# Patient Record
Sex: MALE | Race: BLACK OR AFRICAN AMERICAN | Employment: UNEMPLOYED | URBAN - METROPOLITAN AREA
[De-identification: names, ages, dates, MRNs, and addresses within clinical notes are randomized per-mention and may not be internally consistent; named-entity substitution may affect disease eponyms.]

---

## 2017-04-01 ENCOUNTER — HOSPITAL ENCOUNTER (EMERGENCY)
Age: 27
Discharge: HOME | End: 2017-04-01
Attending: INTERNAL MEDICINE
Payer: MEDICAID

## 2017-04-01 VITALS
HEART RATE: 125 BPM | WEIGHT: 315 LBS | TEMPERATURE: 100.2 F | SYSTOLIC BLOOD PRESSURE: 138 MMHG | RESPIRATION RATE: 20 BRPM | HEIGHT: 72 IN | OXYGEN SATURATION: 100 % | DIASTOLIC BLOOD PRESSURE: 87 MMHG | BODY MASS INDEX: 42.66 KG/M2

## 2017-04-01 DIAGNOSIS — R68.89 FLU-LIKE SYMPTOMS: Primary | ICD-10-CM

## 2017-04-01 DIAGNOSIS — J02.9 PHARYNGITIS, UNSPECIFIED ETIOLOGY: ICD-10-CM

## 2017-04-01 LAB
FLUAV AG NPH QL IA: NEGATIVE
FLUBV AG NOSE QL IA: NEGATIVE

## 2017-04-01 PROCEDURE — 87804 INFLUENZA ASSAY W/OPTIC: CPT | Performed by: INTERNAL MEDICINE

## 2017-04-01 PROCEDURE — 87081 CULTURE SCREEN ONLY: CPT | Performed by: INTERNAL MEDICINE

## 2017-04-01 PROCEDURE — 99283 EMERGENCY DEPT VISIT LOW MDM: CPT

## 2017-04-01 RX ORDER — AZITHROMYCIN 250 MG/1
TABLET, FILM COATED ORAL
Qty: 6 TAB | Refills: 0 | Status: SHIPPED | OUTPATIENT
Start: 2017-04-01 | End: 2017-04-06

## 2017-04-01 RX ORDER — LIDOCAINE HYDROCHLORIDE 20 MG/ML
SOLUTION OROPHARYNGEAL
Qty: 200 ML | Refills: 0 | Status: SHIPPED | OUTPATIENT
Start: 2017-04-01 | End: 2017-07-03 | Stop reason: ALTCHOICE

## 2017-04-01 RX ORDER — IBUPROFEN 800 MG/1
800 TABLET ORAL
Qty: 20 TAB | Refills: 0 | Status: SHIPPED | OUTPATIENT
Start: 2017-04-01 | End: 2017-04-08

## 2017-04-01 RX ORDER — ACETAMINOPHEN AND CODEINE PHOSPHATE 300; 30 MG/1; MG/1
1 TABLET ORAL
Qty: 12 TAB | Refills: 0 | Status: SHIPPED | OUTPATIENT
Start: 2017-04-01 | End: 2017-07-03

## 2017-04-01 NOTE — LETTER
[facility] [place] 
THE [facility] EMERGENCY DEPT 
[address] [address] 
[phone] Work Note Date: 4/1/2017 To Whom It May concern: 
 
Tiffanie Ca was seen and treated today in the emergency room by the following provider(s): 
Attending Provider: Galileo Metcalf MD.   
 
Tiffanie Ca may return to work on 4/3/17. Sincerely, Galileo Metcalf MD

## 2017-04-01 NOTE — ED PROVIDER NOTES
[address]  EMERGENCY DEPARTMENT HISTORY AND PHYSICAL EXAM       Date: 4/1/2017   Patient Name: Angel Franklin   YOB: 1990  Medical Record Number: [id number]    History of Presenting Illness     Chief Complaint   Patient presents with    Sore Throat    Headache        History Provided By:  Patient     Additional History:   11:48 AM  Angel Franklin is a 27 y.o. male who presents to the emergency department C/O a non-radiating worsening HA (9.5/10) and sore throat onset yesterday. Associated sxs include productive cough w/ phlegm and nausea. Pt denies any neck pain, CP, vomiting, ear pain, rashes, and any other sxs or complaints at this time. Primary Care Provider: Jamialh Carroll MD   Specialist:    Past History     Past Medical History:   Past Medical History:   Diagnosis Date    Psychiatric disorder     anxiety    Restless leg syndrome         Past Surgical History:   History reviewed. No pertinent surgical history. Family History:   History reviewed. No pertinent family history. Social History:   Social History   Substance Use Topics    Smoking status: Never Smoker    Smokeless tobacco: None    Alcohol use No        Allergies: Allergies   Allergen Reactions    Penicillins Other (comments)     Since he was a kid        Review of Systems   Review of Systems   HENT: Positive for sore throat. Negative for ear pain. Cardiovascular: Negative for chest pain. Gastrointestinal: Positive for nausea. Negative for vomiting. Musculoskeletal: Negative for neck pain. Skin: Negative for rash. Neurological: Positive for headaches. All other systems reviewed and are negative. Physical Exam  Vitals:    04/01/17 1118   BP: 138/87   Pulse: (!) 125   Resp: 20   Temp: 100.2 °F (37.9 °C)   SpO2: 100%   Weight: 145.2 kg (320 lb)   Height: 6' (1.829 m)       Physical Exam   Constitutional: He is oriented to person, place, and time.  He appears well-developed and well-nourished. HENT:   Head: Normocephalic and atraumatic. Right Ear: External ear normal. Tympanic membrane is erythematous. Left Ear: External ear normal. Tympanic membrane is erythematous. Nose: Mucosal edema present. Mouth/Throat: Posterior oropharyngeal edema present. No ear effusions  Moderate bilateral turbinates w/ edema and erythema   Throat is mild redness, some postnasal drainage    Eyes: Conjunctivae and EOM are normal. Pupils are equal, round, and reactive to light. Neck: Normal range of motion. Neck supple. No JVD present. No tracheal deviation present. No thyromegaly present. Cardiovascular: Normal rate, regular rhythm, normal heart sounds and intact distal pulses. Pulmonary/Chest: Effort normal and breath sounds normal.   Abdominal: Soft. Bowel sounds are normal. He exhibits no distension and no mass. There is no tenderness. No HSM   Musculoskeletal: Normal range of motion. He exhibits no edema or tenderness. Lymphadenopathy:     He has no cervical adenopathy. Neurological: He is alert and oriented to person, place, and time. He has normal reflexes. No cranial nerve deficit. He exhibits normal muscle tone. Coordination normal.   No focal weakness   Skin: Skin is warm and dry. Psychiatric: He has a normal mood and affect. His behavior is normal. Thought content normal.   Nursing note and vitals reviewed. Diagnostic Study Results     Labs -      Recent Results (from the past 12 hour(s))   STREP THROAT SCREEN    Collection Time: 04/01/17 11:20 AM   Result Value Ref Range    Special Requests: NO SPECIAL REQUESTS      Strep Screen NEGATIVE       Strep Screen (NOTE)  RAPID TEST PERFORMED BY [id number].        Culture result: PENDING    INFLUENZA A & B AG (RAPID TEST)    Collection Time: 04/01/17 12:10 PM   Result Value Ref Range    Influenza A Antigen NEGATIVE  NEG      Influenza B Antigen NEGATIVE  NEG         Radiologic Studies -  No orders to display       Medical Decision Making   I am the first provider for this patient. I reviewed the vital signs, available nursing notes, past medical history, past surgical history, family history and social history. DDx: Flu, other viral pharyngitis  Doubt: PNA    Pt does not appear septic     Vital Signs-Reviewed the patient's vital signs. Patient Vitals for the past 12 hrs:   Temp Pulse Resp BP SpO2   04/01/17 1118 100.2 °F (37.9 °C) (!) 125 20 138/87 100 %       Pulse Oximetry Analysis - Normal 100% on Room Air      ED Course:  11:48 AM  Initial assessment performed. Medications Given in the ED:  Medications - No data to display    Discharge Note:  1:07 PM  Pt has been reexamined. Patient has no new complaints, changes, or physical findings. Care plan outlined and precautions discussed. Results were reviewed with the patient. All medications were reviewed with the patient; will d/c home with Motrin, Tylenol #3, Lidocaine, and Azithromycin. All of pt's questions and concerns were addressed. Patient was instructed and agrees to follow up with [facility], as well as to return to the ED upon further deterioration. Patient is ready to go home. Diagnosis   Clinical Impression:   1. Flu-like symptoms    2. Pharyngitis, unspecified etiology         Follow-up Information     Follow up With Details Comments Contact Info    [facility] Schedule an appointment as soon as possible for a visit in 2 days Follow up with your primary care physician or at the [facility] clinic  [id number] [place] [address], [address] [place] [address] Road [phone]    THE [facility] EMERGENCY DEPT  As needed, If symptoms worsen [address]  [facility] [address] [phone]          Current Discharge Medication List      START taking these medications    Details   ibuprofen (MOTRIN) 800 mg tablet Take 1 Tab by mouth every eight (8) hours as needed for Pain for up to 7 days.   Qty: 20 Tab, Refills: 0      azithromycin (ZITHROMAX) 250 mg tablet Take 2 tablets today then 1 tablet daily  x4 days  Qty: 6 Tab, Refills: 0      acetaminophen-codeine (TYLENOL-CODEINE #3) 300-30 mg per tablet Take 1 Tab by mouth every four (4) hours as needed for Pain. Max Daily Amount: 6 Tabs. Qty: 12 Tab, Refills: 0      lidocaine (LIDOCAINE VISCOUS) 2 % solution Put 10 mL in mouth and swish and spit out QAC and at bedtime  Qty: 200 mL, Refills: 0             _______________________________   Attestations: This note is prepared by Geraldine Wilkinson, acting as a Scribe for Vivienne Grimes MD on 11:45 AM on 4/1/2017 . Vivienne Grimes MD: The scribe's documentation has been prepared under my direction and personally reviewed by me in its entirety.   _______________________________

## 2017-04-01 NOTE — DISCHARGE INSTRUCTIONS
Sore Throat: Care Instructions  Your Care Instructions    Infection by bacteria or a virus causes most sore throats. Cigarette smoke, dry air, air pollution, allergies, and yelling can also cause a sore throat. Sore throats can be painful and annoying. Fortunately, most sore throats go away on their own. If you have a bacterial infection, your doctor may prescribe antibiotics. Follow-up care is a key part of your treatment and safety. Be sure to make and go to all appointments, and call your doctor if you are having problems. It's also a good idea to know your test results and keep a list of the medicines you take. How can you care for yourself at home? · If your doctor prescribed antibiotics, take them as directed. Do not stop taking them just because you feel better. You need to take the full course of antibiotics. · Gargle with warm salt water once an hour to help reduce swelling and relieve discomfort. Use 1 teaspoon of salt mixed in 1 cup of warm water. · Take an over-the-counter pain medicine, such as acetaminophen (Tylenol), ibuprofen (Advil, Motrin), or naproxen (Aleve). Read and follow all instructions on the label. · Be careful when taking over-the-counter cold or flu medicines and Tylenol at the same time. Many of these medicines have acetaminophen, which is Tylenol. Read the labels to make sure that you are not taking more than the recommended dose. Too much acetaminophen (Tylenol) can be harmful. · Drink plenty of fluids. Fluids may help soothe an irritated throat. Hot fluids, such as tea or soup, may help decrease throat pain. · Use over-the-counter throat lozenges to soothe pain. Regular cough drops or hard candy may also help. These should not be given to young children because of the risk of choking. · Do not smoke or allow others to smoke around you. If you need help quitting, talk to your doctor about stop-smoking programs and medicines.  These can increase your chances of quitting for good. · Use a vaporizer or humidifier to add moisture to your bedroom. Follow the directions for cleaning the machine. When should you call for help? Call your doctor now or seek immediate medical care if:  · You have new or worse trouble swallowing. · Your sore throat gets much worse on one side. Watch closely for changes in your health, and be sure to contact your doctor if you do not get better as expected. Where can you learn more? Go to http://lesly-ad.info/. Enter [phone] 80 [phone] in the search box to learn more about \"Sore Throat: Care Instructions. \"  Current as of: July 29, 2016  Content Version: 11.2  © 1129-1131 [employer]. Care instructions adapted under license by [employer] (which disclaims liability or warranty for this information). If you have questions about a medical condition or this instruction, always ask your healthcare professional. [address] any warranty or liability for your use of this information.

## 2017-04-03 LAB
B-HEM STREP THROAT QL CULT: NEGATIVE
B-HEM STREP THROAT QL CULT: NORMAL
BACTERIA SPEC CULT: NORMAL
SERVICE CMNT-IMP: NORMAL

## 2017-07-03 ENCOUNTER — HOSPITAL ENCOUNTER (EMERGENCY)
Age: 27
Discharge: HOME | End: 2017-07-03
Attending: EMERGENCY MEDICINE
Payer: MEDICAID

## 2017-07-03 VITALS
HEIGHT: 72 IN | RESPIRATION RATE: 18 BRPM | SYSTOLIC BLOOD PRESSURE: 150 MMHG | HEART RATE: 96 BPM | BODY MASS INDEX: 42.66 KG/M2 | DIASTOLIC BLOOD PRESSURE: 100 MMHG | OXYGEN SATURATION: 95 % | TEMPERATURE: 98.3 F | WEIGHT: 315 LBS

## 2017-07-03 DIAGNOSIS — T23.231A SECOND DEGREE BURN OF HAND AND FINGERS, RIGHT, INITIAL ENCOUNTER: Primary | ICD-10-CM

## 2017-07-03 DIAGNOSIS — T23.201A SECOND DEGREE BURN OF HAND AND FINGERS, RIGHT, INITIAL ENCOUNTER: Primary | ICD-10-CM

## 2017-07-03 PROCEDURE — 99283 EMERGENCY DEPT VISIT LOW MDM: CPT

## 2017-07-03 RX ORDER — ALPRAZOLAM 2 MG/1
2 TABLET ORAL
COMMUNITY
End: 2017-10-19

## 2017-07-03 NOTE — DISCHARGE INSTRUCTIONS
Major Brower: Care Instructions  Your Care Instructions    Burns injure the skin and can also injure other parts of your body, such as your muscles, nerves, lungs, and eyes. Burns may also become infected easily. Pain from a burn may get worse in the first few weeks as the burn heals. The color, texture, and feel of your skin will change as new skin and scar tissue form. You may notice that the burned area feels tight and hard while it is healing. It is important to continue to move the area as the burn heals to prevent loss of motion or loss of function in the area. Complete healing of a burn may take from a few months to up to a year. Recovering from a burn can be a painful and trying process, but there are steps you can take to make sure that the burn heals as well as possible. Follow-up care is a key part of your treatment and safety. Be sure to make and go to all appointments, and call your doctor if you are having problems. Its also a good idea to know your test results and keep a list of the medicines you take. How can you care for yourself at home? · Follow your doctor's instructions for caring for your burn. You may need special bandages or a compression garment if you have a very deep burn. · Keep your burn clean and dry. ¨ Wash the burn every day with a mild soap and water. ¨ Gently pat the burn dry after you wash and rinse it. · Protect your burn while it is healing. Cover your burn if you are going out in the cold or the sun. ¨ If the burn is on your hands or arms, wear long sleeves. ¨ Wear a hat if the burn is on your face. ¨ Wear shoes and socks if the burn is on your feet. · If your doctor prescribed antibiotics, take them as directed. Do not stop taking them just because you feel better. You need to take the full course of antibiotics. · Take an over-the-counter pain medicine, such as acetaminophen (Tylenol), ibuprofen (Advil, Motrin), or naproxen (Aleve), as needed.  Read and follow all instructions on the label. Do not use aspirin, because it can make bleeding in the burned area worse. · Do not take two or more pain medicines at the same time unless the doctor told you to. Many pain medicines have acetaminophen, which is Tylenol. Too much acetaminophen (Tylenol) can be harmful. · Do not scratch your burn. Talk to your doctor about what to use on your burn for itching. · Drink plenty of fluids. If you have kidney, heart, or liver disease and have to limit fluids, talk with your doctor before you increase the amount of fluids you drink. · Eat a healthy diet. Make sure that you are eating foods that have enough calories and protein to promote healing. Ask your doctor if you should take any extra vitamins or other supplements. · Do not smoke. Smoking slows healing and delays tissue repair. If you need help quitting, talk to your doctor about stop-smoking programs and medicines. These can increase your chances of quitting for good. When should you call for help? Call your doctor now or seek immediate medical care if:  · You have signs of infection, such as:  ¨ Increased pain, swelling, warmth, or redness. ¨ Red streaks leading from the burn. ¨ Pus draining from the burn. ¨ A fever. · You cannot move the burned area, or the area feels numb. Watch closely for changes in your health, and be sure to contact your doctor if:  · You do not get better as expected. Where can you learn more? Go to http://lesly-ad.info/. Enter P498 in the search box to learn more about \"Major Brower: Care Instructions. \"  Current as of: March 20, 2017  Content Version: 11.3  © 4538-3484 [employer]. Care instructions adapted under license by [employer] (which disclaims liability or warranty for this information).  If you have questions about a medical condition or this instruction, always ask your healthcare professional. [address] any warranty or liability for your use of this information.

## 2017-07-03 NOTE — LETTER
[facility] [place] 
THE [facility] EMERGENCY DEPT 
[address] [id number] [phone] Work/School Note Date: 7/3/2017 To Whom It May concern: 
 
Hardik Peng was seen and treated today in the emergency room by the following provider(s): 
Attending Provider: Armond Martino MD 
Physician Assistant: ZA Kaminski. Hardik Peng may return to work with limited duty. Limited use of right hand, no lifting/pushing/pulling of over 20 lbs for at least today and tomorrow or until cleared by PCP. Sincerely, Ravinder Jennings PA-C

## 2017-07-03 NOTE — ED NOTES
Discharge paperwork given to patient. Patient verbalizes understanding. Armband removed and shredded. Patient educated regarding pain medication, dosage, administration and side effects. Patient educated non-pharmacological pain intervention therapies.
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
## 2017-08-10 ENCOUNTER — OFFICE VISIT (OUTPATIENT)
Dept: SURGERY | Age: 27
End: 2017-08-10

## 2017-08-10 VITALS
HEART RATE: 92 BPM | DIASTOLIC BLOOD PRESSURE: 90 MMHG | RESPIRATION RATE: 16 BRPM | WEIGHT: 315 LBS | BODY MASS INDEX: 42.66 KG/M2 | SYSTOLIC BLOOD PRESSURE: 139 MMHG | OXYGEN SATURATION: 97 % | HEIGHT: 72 IN

## 2017-08-10 DIAGNOSIS — E66.01 MORBID OBESITY DUE TO EXCESS CALORIES (HCC): ICD-10-CM

## 2017-08-10 DIAGNOSIS — R06.83 SNORES: ICD-10-CM

## 2017-08-10 DIAGNOSIS — E66.01 MORBID OBESITY DUE TO EXCESS CALORIES (HCC): Primary | ICD-10-CM

## 2017-08-10 DIAGNOSIS — G89.29 CHRONIC LOW BACK PAIN, UNSPECIFIED BACK PAIN LATERALITY, WITH SCIATICA PRESENCE UNSPECIFIED: ICD-10-CM

## 2017-08-10 DIAGNOSIS — E66.01 MORBID OBESITY WITH BODY MASS INDEX (BMI) OF 50.0 TO 59.9 IN ADULT (HCC): ICD-10-CM

## 2017-08-10 DIAGNOSIS — K21.9 GASTROESOPHAGEAL REFLUX DISEASE WITHOUT ESOPHAGITIS: ICD-10-CM

## 2017-08-10 DIAGNOSIS — F41.9 ANXIETY: ICD-10-CM

## 2017-08-10 DIAGNOSIS — I10 ESSENTIAL HYPERTENSION: ICD-10-CM

## 2017-08-10 DIAGNOSIS — M54.5 CHRONIC LOW BACK PAIN, UNSPECIFIED BACK PAIN LATERALITY, WITH SCIATICA PRESENCE UNSPECIFIED: ICD-10-CM

## 2017-08-10 RX ORDER — TRAMADOL HYDROCHLORIDE 50 MG/1
50 TABLET ORAL
COMMUNITY
End: 2017-12-14

## 2017-08-10 RX ORDER — HYDROCHLOROTHIAZIDE 12.5 MG/1
12.5 TABLET ORAL DAILY
COMMUNITY
End: 2017-09-26

## 2017-08-10 NOTE — PATIENT INSTRUCTIONS
Body Mass Index: Care Instructions  Your Care Instructions    Body mass index (BMI) can help you see if your weight is raising your risk for health problems. It uses a formula to compare how much you weigh with how tall you are. · A BMI lower than 18.5 is considered underweight. · A BMI between 18.5 and 24.9 is considered healthy. · A BMI between 25 and 29.9 is considered overweight. A BMI of 30 or higher is considered obese. If your BMI is in the normal range, it means that you have a lower risk for weight-related health problems. If your BMI is in the overweight or obese range, you may be at increased risk for weight-related health problems, such as high blood pressure, heart disease, stroke, arthritis or joint pain, and diabetes. If your BMI is in the underweight range, you may be at increased risk for health problems such as fatigue, lower protection (immunity) against illness, muscle loss, bone loss, hair loss, and hormone problems. BMI is just one measure of your risk for weight-related health problems. You may be at higher risk for health problems if you are not active, you eat an unhealthy diet, or you drink too much alcohol or use tobacco products. Follow-up care is a key part of your treatment and safety. Be sure to make and go to all appointments, and call your doctor if you are having problems. It's also a good idea to know your test results and keep a list of the medicines you take. How can you care for yourself at home? · Practice healthy eating habits. This includes eating plenty of fruits, vegetables, whole grains, lean protein, and low-fat dairy. · If your doctor recommends it, get more exercise. Walking is a good choice. Bit by bit, increase the amount you walk every day. Try for at least 30 minutes on most days of the week. · Do not smoke. Smoking can increase your risk for health problems. If you need help quitting, talk to your doctor about stop-smoking programs and medicines. These can increase your chances of quitting for good. · Limit alcohol to 2 drinks a day for men and 1 drink a day for women. Too much alcohol can cause health problems. If you have a BMI higher than 25  · Your doctor may do other tests to check your risk for weight-related health problems. This may include measuring the distance around your waist. A waist measurement of more than 40 inches in men or 35 inches in women can increase the risk of weight-related health problems. · Talk with your doctor about steps you can take to stay healthy or improve your health. You may need to make lifestyle changes to lose weight and stay healthy, such as changing your diet and getting regular exercise. If you have a BMI lower than 18.5  · Your doctor may do other tests to check your risk for health problems. · Talk with your doctor about steps you can take to stay healthy or improve your health. You may need to make lifestyle changes to gain or maintain weight and stay healthy, such as getting more healthy foods in your diet and doing exercises to build muscle. Where can you learn more? Go to http://lesly-ad.info/. Enter S176 in the search box to learn more about \"Body Mass Index: Care Instructions. \"  Current as of: January 23, 2017  Content Version: 11.3  © 4896-1266 [employer]. Care instructions adapted under license by [employer] (which disclaims liability or warranty for this information). If you have questions about a medical condition or this instruction, always ask your healthcare professional. [place] [address] any warranty or liability for your use of this information. New patient Instructions      1. Ensure all pre-operative insurances requirements are complete (ie; dietary visits, psychology consults, primary care documentation, etc)    2. Adhere to pre-operative weight loss / weight maintenance plan discussed in the office today.     3. Contact the office with any questions on pre-operative clearance issues (ie; cardiology work-up, pulmonary work-up, upper GI study, etc). 4. If a barium upper GI study has been ordered for your evaluation, make sure you are on liquids only the morning of the procedure.

## 2017-08-10 NOTE — PROGRESS NOTES
Bariatric Surgery Consultation    Subjective: The patient is a 32 y.o. obese male with a Body mass index is 54.11 kg/(m^2). Kylee Montague The patient is currently his heaviest weight for the past several years. he has been overweight since his teen years. he has been considering surgery since last year. he desires surgery at this time because of multiple health concerns and their lifestyle issues which are hindered by their weight. he has been referred by his family physician Dr Sascha Yost for evaluation and treatment of their obesity via surgical intervention. John Ba has tried multiple diets in his lifetime most recently tried behavior modification and unsupervised diets    Bariatric comorbidities present are   Patient Active Problem List   Diagnosis Code    Morbid obesity ([facility] [address] E66.01    Morbid obesity with body mass index (BMI) of 50.0 to 59.9 in adult ([facility] [address] E66.01, Z68.43    Anxiety F41.9    Chronic back pain M54.9, G89.29    Hypertension I10    Snores R06.83    GERD (gastroesophageal reflux disease) K21.9    Sleep disorder breathing G47.30       The patient is considering laparoscopic sleeve gastrectomy for surgical weight loss due to their ineffective progress with medical forms of weight loss and the urging of their physician who cares for their primary medical issues. The patient  now presents  for consideration for weight loss surgery understanding the benefits of this over a medical approach of weight loss as was discussed in our presentation on weight loss surgery. They have discussed their plans both with their family and primary care physician who is in support of their pursuit of such. The patient has had no health issues as of late and denies and gastrointestinal disturbances other than what is outlined below in their review of symptoms.  All of their prior evaluations available by both their PCP's and specialists physicians have been reviewed today either in the Care Everywhere portal or scanned under the media tab. I have spent a large portion of my initial consultation today reviewing the patients current dietary habits which have contributed to their health issues and obesity. I have suggested to them personally a dietary regimen that they can initiate now to help with their status as it pertains to their weight. They understand that the most important aspect of their journey through their weight loss endeavor will be their adherence to a new lifestyle of healthy eating behavior. They also understand that an adherence to an exercise program will not only help with weight loss but is ultimately important in weight maintenance. The patients goal weight is 199 lb. Patient Active Problem List    Diagnosis Date Noted    Morbid obesity ([facility] [address]     Morbid obesity with body mass index (BMI) of 50.0 to 59.9 in adult [facility])     Anxiety     Chronic back pain     Hypertension     Snores     GERD (gastroesophageal reflux disease)     Sleep disorder breathing      History reviewed. No pertinent surgical history. Social History   Substance Use Topics    Smoking status: Never Smoker    Smokeless tobacco: Never Used    Alcohol use Yes      Family History   Problem Relation Age of Onset    Arthritis-rheumatoid Mother       Current Outpatient Prescriptions   Medication Sig Dispense Refill    traMADol (ULTRAM) 50 mg tablet Take 50 mg by mouth every six (6) hours as needed for Pain.  hydroCHLOROthiazide (HYDRODIURIL) 12.5 mg tablet Take 12.5 mg by mouth daily.  ALPRAZolam (XANAX) 2 mg tablet Take 2 mg by mouth.  gabapentin (NEURONTIN) 300 mg capsule Take 900 mg by mouth nightly as needed.        Allergies   Allergen Reactions    Penicillins Other (comments)     Since he was a kid          Review of Systems:     General - No history or complaints of unexpected fever, chills, or weight loss  Head/Neck - No history or complaints of headache, diplopia, dysphagia, hearing loss  Cardiac - No history or complaints of chest pain, palpitations, murmur, or shortness of breath  Pulmonary - No history or complaints of shortness of breath, productive cough, hemoptysis  Gastrointestinal - (+) reflux,no  abdominal pain, obstipation/constipation or blood per rectum  Genitourinary - No history or complaints of hematuria/dysuria, stress urinary incontinence symptoms, or renal lithiasis  Musculoskeletal - moderate joint pain in their lower back pain,  no muscular weakness  Hematologic - No history or complaints of bleeding disorders,  No blood transfusions  Neurologic - No history or complaints of  migraine headaches, seizure activity, syncopal episodes, TIA or stroke  Integumentary - No history or complaints of rashes, abnormal nevi, skin cancer    Objective:     Visit Vitals    /90 (BP 1 Location: Left arm, BP Patient Position: Sitting)    Pulse 92    Resp 16    Ht 6' (1.829 m)    Wt (!) 181 kg (399 lb)    SpO2 97%    BMI 54.11 kg/m2       Physical Examination: General appearance - alert, well appearing, and in no distress  Mental status - alert, oriented to person, place, and time  Eyes - pupils equal and reactive, extraocular eye movements intact  Ears - bilateral TM's and external ear canals normal  Nose - normal and patent, no erythema, discharge or polyps  Mouth - mucous membranes moist, pharynx normal without lesions  Neck - supple, no significant adenopathy  Lymphatics - no palpable lymphadenopathy, no hepatosplenomegaly  Chest - clear to auscultation, no wheezes, rales or rhonchi, symmetric air entry  Heart - normal rate, regular rhythm, normal S1, S2, no murmurs, rubs, clicks or gallops  Abdomen - soft, nontender, nondistended, no masses or organomegaly  Back exam - full range of motion, no tenderness, palpable spasm or pain on motion  Neurological - alert, oriented, normal speech, no focal findings or movement disorder noted  Musculoskeletal - no joint tenderness, deformity or swelling  Extremities - peripheral pulses normal, no pedal edema, no clubbing or cyanosis  Skin - normal coloration and turgor, no rashes, no suspicious skin lesions noted    Labs:       No results found for this or any previous visit (from the past 1440 hour(s)). Assessment:     Morbid obesity with comorbidity    Plan:     laparoscopic sleeve gastrectomy    This is a 32 y.o. male with a BMI of Body mass index is 54.11 kg/(m^2). and the weight-related co-morbidties as noted below. Makeda Colon meets the NIH criteria for bariatric surgery based upon the BMI of Body mass index is 54.11 kg/(m^2). and multiple weight-related co-morbidties. Makeda Colon has elected laparoscopic sleeve gastrectomy as his intervention of choice for treatment of morbid obestiy through surgical means secondary to its safety profile, rapid return to work  and decreases in operative risks over gastric bypass. In the office today, following Randall's history and physical examination, a 30 minute discussion regarding the anatomic alterations for the laparoscopic sleeve gastrectomy was undertaken. The dietary expectations and the patient and physician dependent factors for success were thoroughly discussed, to include the need for interval follow-up and long-term dietary changes associated with success. The possible complications of the sleeve gastrectomy  were also discussed, to include;death, DVT/PE, staple line leak, bleeding, stricture formation, infection, nutritional deficiencies and sleeve dilation. Specific weight related outcomes for success were also discussed with an emphasis on careful and close follow-up with the first year and eating behavior modification as the baseline and cyclical hunger return. The patient expressed an understanding of the above factors, and his questions were answered in their entirety.     In addition, the patient attended a 1.5 hour power point seminar regarding obesity, surgical weight loss including, adjustable gastric band, gastric bypass, and sleeve gastrectomy. This discussion contrasted the different surgical techniques, mechanisms of actions and expected outcomes, and surgical and medical risks associated with each procedure. During this seminar, there was a long question and answer session where each questions was answered until there were no additional questions. Today, the patient had all of his questions answered and desires to proceed with  bariatric surgery initially choosing sleeve gastrectomy as his surgical option. Secondary Diagnoses:     Dietary Intervention  - The patient is currently scheduled to see or has been followed by a bariatric nutritionist for an attempt at preoperative weight loss as has been dictated by their insurance carrier. They will be assessed at various times during their follow up to evaluate their progress depending on the length of time that is required once again by their carrier. I have explained the importance of preoperative weight loss and the benefits regarding lower surgical risk and also assisting the patient in reaching their weight loss goal.  Finally they understand their is a physiologic benefit from the standpoint of hepatic volume reduction preoperatively. I have reiterated the importance of a low carbohydrate and high protein regimen to achieve their stated goal.    Hypertension - The patient has a clear understanding of how weight loss improves hypertension as a whole, but also they understand that there is a significant genetic component to this disease process.  We will monitor the patients blood pressure while in the hospital and the plan would be to continue those medications postoperatively.  If a diuretic is being used we will stop them on discharge to prevent dehydration particularly with the sleeve gastrectomy and the gastric bypass procedures.  They will be instructed to monitor their blood pressure postoperatively while at home and notify their primary care physician in the event of any significantly high or uncharacteristic readings. GERD -The patient understands that weight loss surgery is not a guaranteed cure for reflux disease but does understand the benefits that weight loss can have on reflux disease.  They also understand that at the time of surgery the gastroesophageal junction will be evaluated for the presence of a diaphragmatic hernia.  Hernias will be corrected always with the gastric band and sleeve gastrectomy procedures, but only on a case by case basis with the gastric bypass if it prevents our ability to perform the operation at hand, or if I feel that they would benefit long term with correction of this issue.  The patient also understands that neither weight loss surgery nor repair of a diaphragmatic hernia repair guarantees the complete cessation of the disease. They also understand there is a possibility of recurrence with a simple crural repair as is performed with these procedures. They understand they may have to continue their medications in the postoperative period. They have a good understanding that the gastric bypass procedure is better suited to total resolution of this issue and that neither the Lap Band nor sleeve gastrectomy is considered a curative procedure as it pertains to this diagnosis.     Weight Related Arthritis -The patient understands the benefits that weight loss surgery can have on their arthritis but also understands that weight loss is not a guaranteed cure and relief of symptoms is often dependent on the severity of the underlying disease.  The patient also understands that traditional pharmaceutical treatments for this diagnosis are usually unavailable to post-operative weight loss patients due to the effects on the gastrointestinal tract particularly with the gastric bypass and to a lesser effect with the sleeve gastrectomy.  Any changes to the patients medication treatment will ultimately be made the patients PCP with input by our office. Possible Obstructive Sleep Apnea -The patient understands the association of sleep apnea and obesity and the additional risk that it caries related to post surgical complications. If they have not been tested for sleep apnea and I feel they are at increased risk for this diagnosis, then they will be scheduled for a consultation with a Pulmonologist for such. In the event that they josef this diagnosis we will have the patient bring their CPAP machine to the hospital for use both postoperatively in the PACU and on the floor at its appropriate setting.  We will have them continue using it while at home after surgery and follow up with their pulmonologist 6 months after to be retested to see if it can be discontinued at that time period. Will send for sleep study.       Signed By: Keli Ames MD     August 10, 2017

## 2017-09-26 ENCOUNTER — HOSPITAL ENCOUNTER (EMERGENCY)
Age: 27
Discharge: HOME | End: 2017-09-26
Attending: FAMILY MEDICINE | Admitting: FAMILY MEDICINE
Payer: MEDICAID

## 2017-09-26 VITALS
WEIGHT: 315 LBS | HEIGHT: 72 IN | DIASTOLIC BLOOD PRESSURE: 110 MMHG | OXYGEN SATURATION: 96 % | SYSTOLIC BLOOD PRESSURE: 150 MMHG | HEART RATE: 112 BPM | TEMPERATURE: 98.8 F | RESPIRATION RATE: 20 BRPM | BODY MASS INDEX: 42.66 KG/M2

## 2017-09-26 DIAGNOSIS — R10.2 SUPRAPUBIC PAIN: Primary | ICD-10-CM

## 2017-09-26 LAB
APPEARANCE UR: CLEAR
BILIRUB UR QL: NEGATIVE
COLOR UR: YELLOW
GLUCOSE UR STRIP.AUTO-MCNC: NEGATIVE MG/DL
HGB UR QL STRIP: NEGATIVE
KETONES UR QL STRIP.AUTO: NEGATIVE MG/DL
LEUKOCYTE ESTERASE UR QL STRIP.AUTO: NEGATIVE
NITRITE UR QL STRIP.AUTO: NEGATIVE
PH UR STRIP: 6 [PH] (ref 5–8)
PROT UR STRIP-MCNC: NEGATIVE MG/DL
SP GR UR REFRACTOMETRY: 1.03 (ref 1–1.03)
UROBILINOGEN UR QL STRIP.AUTO: 1 EU/DL (ref 0.2–1)

## 2017-09-26 PROCEDURE — 99283 EMERGENCY DEPT VISIT LOW MDM: CPT

## 2017-09-26 PROCEDURE — 81003 URINALYSIS AUTO W/O SCOPE: CPT | Performed by: FAMILY MEDICINE

## 2017-09-26 NOTE — DISCHARGE INSTRUCTIONS

## 2017-09-26 NOTE — ED TRIAGE NOTES
Pt states for last 5 days he feels like his left testicle is swollen states \"I think there's some extra tissue in it\"  States has left sided groin pain also
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
## 2017-09-26 NOTE — ED NOTES
Patient discharged at this time. Verbalized understanding of plan of care and discharge instructions. Prescriptions given and understands how to administer at home. Arm band placed in shred it. See scanned documents for signed discharge instructions. Pt refused another set of vital signs.

## 2017-10-19 ENCOUNTER — HOSPITAL ENCOUNTER (EMERGENCY)
Age: 27
Discharge: HOME | End: 2017-10-19
Attending: EMERGENCY MEDICINE | Admitting: EMERGENCY MEDICINE
Payer: MEDICAID

## 2017-10-19 VITALS
RESPIRATION RATE: 20 BRPM | HEIGHT: 72 IN | BODY MASS INDEX: 42.66 KG/M2 | HEART RATE: 76 BPM | DIASTOLIC BLOOD PRESSURE: 86 MMHG | OXYGEN SATURATION: 98 % | SYSTOLIC BLOOD PRESSURE: 138 MMHG | WEIGHT: 315 LBS | TEMPERATURE: 98.2 F

## 2017-10-19 DIAGNOSIS — R51.9 CHRONIC NONINTRACTABLE HEADACHE, UNSPECIFIED HEADACHE TYPE: Primary | ICD-10-CM

## 2017-10-19 DIAGNOSIS — G89.29 CHRONIC NONINTRACTABLE HEADACHE, UNSPECIFIED HEADACHE TYPE: Primary | ICD-10-CM

## 2017-10-19 PROCEDURE — 96372 THER/PROPH/DIAG INJ SC/IM: CPT

## 2017-10-19 PROCEDURE — 99282 EMERGENCY DEPT VISIT SF MDM: CPT

## 2017-10-19 PROCEDURE — 74011250636 HC RX REV CODE- 250/636: Performed by: EMERGENCY MEDICINE

## 2017-10-19 RX ORDER — KETOROLAC TROMETHAMINE 10 MG/1
10 TABLET, FILM COATED ORAL EVERY 8 HOURS
Qty: 15 TAB | Refills: 0 | Status: SHIPPED | OUTPATIENT
Start: 2017-10-19 | End: 2017-10-24

## 2017-10-19 RX ORDER — KETOROLAC TROMETHAMINE 30 MG/ML
30 INJECTION, SOLUTION INTRAMUSCULAR; INTRAVENOUS
Status: DISCONTINUED | OUTPATIENT
Start: 2017-10-19 | End: 2017-10-19

## 2017-10-19 RX ORDER — KETOROLAC TROMETHAMINE 30 MG/ML
60 INJECTION, SOLUTION INTRAMUSCULAR; INTRAVENOUS
Status: COMPLETED | OUTPATIENT
Start: 2017-10-19 | End: 2017-10-19

## 2017-10-19 RX ADMIN — KETOROLAC TROMETHAMINE 60 MG: 30 INJECTION, SOLUTION INTRAMUSCULAR at 03:44
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
## 2017-12-14 ENCOUNTER — APPOINTMENT (OUTPATIENT)
Dept: GENERAL RADIOLOGY | Age: 27
End: 2017-12-14
Attending: PHYSICIAN ASSISTANT
Payer: SELF-PAY

## 2017-12-14 ENCOUNTER — HOSPITAL ENCOUNTER (EMERGENCY)
Age: 27
Discharge: HOME | End: 2017-12-14
Attending: EMERGENCY MEDICINE
Payer: SELF-PAY

## 2017-12-14 VITALS
DIASTOLIC BLOOD PRESSURE: 92 MMHG | OXYGEN SATURATION: 97 % | RESPIRATION RATE: 20 BRPM | SYSTOLIC BLOOD PRESSURE: 142 MMHG | HEART RATE: 83 BPM | TEMPERATURE: 98.3 F

## 2017-12-14 DIAGNOSIS — R05.9 COUGH: Primary | ICD-10-CM

## 2017-12-14 PROCEDURE — 99282 EMERGENCY DEPT VISIT SF MDM: CPT

## 2017-12-14 PROCEDURE — 71020 XR CHEST PA LAT: CPT

## 2017-12-14 RX ORDER — PREDNISONE 10 MG/1
TABLET ORAL
Qty: 21 TAB | Refills: 0 | Status: SHIPPED | OUTPATIENT
Start: 2017-12-14 | End: 2020-09-08

## 2017-12-14 RX ORDER — CODEINE PHOSPHATE AND GUAIFENESIN 10; 100 MG/5ML; MG/5ML
5 SOLUTION ORAL
Qty: 120 ML | Refills: 0 | Status: SHIPPED | OUTPATIENT
Start: 2017-12-14 | End: 2020-09-08
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
## 2017-12-14 NOTE — DISCHARGE INSTRUCTIONS

## 2018-01-07 ENCOUNTER — APPOINTMENT (OUTPATIENT)
Dept: GENERAL RADIOLOGY | Age: 28
End: 2018-01-07
Attending: EMERGENCY MEDICINE
Payer: SELF-PAY

## 2018-01-07 ENCOUNTER — HOSPITAL ENCOUNTER (EMERGENCY)
Age: 28
Discharge: HOME | End: 2018-01-07
Attending: EMERGENCY MEDICINE
Payer: SELF-PAY

## 2018-01-07 VITALS
RESPIRATION RATE: 18 BRPM | HEIGHT: 72 IN | WEIGHT: 315 LBS | BODY MASS INDEX: 42.66 KG/M2 | TEMPERATURE: 97.7 F | DIASTOLIC BLOOD PRESSURE: 93 MMHG | HEART RATE: 94 BPM | SYSTOLIC BLOOD PRESSURE: 133 MMHG | OXYGEN SATURATION: 98 %

## 2018-01-07 DIAGNOSIS — K21.9 GASTROESOPHAGEAL REFLUX DISEASE WITHOUT ESOPHAGITIS: ICD-10-CM

## 2018-01-07 DIAGNOSIS — R05.9 COUGH: Primary | ICD-10-CM

## 2018-01-07 LAB
ATRIAL RATE: 89 BPM
CALCULATED P AXIS, ECG09: 24 DEGREES
CALCULATED R AXIS, ECG10: 18 DEGREES
CALCULATED T AXIS, ECG11: 14 DEGREES
DIAGNOSIS, 93000: NORMAL
P-R INTERVAL, ECG05: 170 MS
Q-T INTERVAL, ECG07: 342 MS
QRS DURATION, ECG06: 86 MS
QTC CALCULATION (BEZET), ECG08: 416 MS
TROPONIN I BLD-MCNC: <0.04 NG/ML (ref 0–0.08)
VENTRICULAR RATE, ECG03: 89 BPM

## 2018-01-07 PROCEDURE — 93005 ELECTROCARDIOGRAM TRACING: CPT

## 2018-01-07 PROCEDURE — 71046 X-RAY EXAM CHEST 2 VIEWS: CPT

## 2018-01-07 PROCEDURE — 99284 EMERGENCY DEPT VISIT MOD MDM: CPT

## 2018-01-07 PROCEDURE — 74011250637 HC RX REV CODE- 250/637: Performed by: EMERGENCY MEDICINE

## 2018-01-07 PROCEDURE — 84484 ASSAY OF TROPONIN QUANT: CPT

## 2018-01-07 RX ORDER — PANTOPRAZOLE SODIUM 40 MG/1
40 TABLET, DELAYED RELEASE ORAL
Status: DISCONTINUED | OUTPATIENT
Start: 2018-01-07 | End: 2018-01-07

## 2018-01-07 RX ORDER — PANTOPRAZOLE SODIUM 40 MG/1
40 TABLET, DELAYED RELEASE ORAL
Status: COMPLETED | OUTPATIENT
Start: 2018-01-07 | End: 2018-01-07

## 2018-01-07 RX ORDER — PANTOPRAZOLE SODIUM 40 MG/1
40 TABLET, DELAYED RELEASE ORAL DAILY
Qty: 20 TAB | Refills: 0 | Status: SHIPPED | OUTPATIENT
Start: 2018-01-07 | End: 2018-01-27

## 2018-01-07 RX ADMIN — PANTOPRAZOLE SODIUM 40 MG: 40 TABLET, DELAYED RELEASE ORAL at 05:45

## 2018-01-07 NOTE — DISCHARGE INSTRUCTIONS
Chronic Cough: Care Instructions  Your Care Instructions    A cough is your body's response to something that bothers your throat or airways. Many things can cause a cough. You might cough because of a cold or the flu, bronchitis, or asthma. Smoking, postnasal drip, allergies, and stomach acid that backs up into your throat also can cause a cough. A cough can be short-term (acute) or long-term (chronic). A chronic cough lasts more than 3 weeks. A chronic cough is often caused by a long-term problem, such as asthma. Another cause might be a medicine, such as an ACE inhibitor. A cough is a symptom, not a disease. To treat a chronic cough, you may need to treat the problem that causes it. You can take a few steps at home to cough less and feel better. Some people cough or clear their throat out of habit for no clear reason. Follow-up care is a key part of your treatment and safety. Be sure to make and go to all appointments, and call your doctor if you are having problems. It's also a good idea to know your test results and keep a list of the medicines you take. How can you care for yourself at home? · Drink plenty of water and other fluids. This may help soothe a dry or sore throat. Honey or lemon juice in hot water or tea may ease a dry cough. · Prop up your head on pillows to help you breathe and ease a cough. · Do not smoke or allow others to smoke around you. Smoke can make a cough worse. If you need help quitting, talk to your doctor about stop-smoking programs and medicines. These can increase your chances of quitting for good. · Avoid exposure to smoke, dust, or other pollutants, or wear a face mask. Check with your doctor or pharmacist to find out which type of face mask will give you the most benefit. · Take cough medicine as directed by your doctor. · Try cough drops to soothe a dry or sore throat. Cough drops don't stop a cough.  Medicine-flavored cough drops are no better than candy-flavored drops or hard candy. Throat clearing  When you have a chronic cough or a disease that may cause this type of cough, you may often feel like you want to clear your throat. This helps bring up mucus. But throat clearing does not always have a cause. Throat clearing can become a habit. The more you do it, the more you feel like you need to do it. But frequent throat clearing can be hard on your vocal cords. It's like slamming them together. To help lessen throat clearing, you can try:  · Taking small sips of water. · Not clearing your throat when you feel you need to. · Swallowing hard when you want to clear your throat. You may want to ask your doctor if a medicine that thins mucus would help. When should you call for help? Call 911 anytime you think you may need emergency care. For example, call if:  ? · You have severe trouble breathing. ?Call your doctor now or seek immediate medical care if:  ? · You cough up blood. ? · You have new or worse trouble breathing. ? · You have a new or higher fever. ? Watch closely for changes in your health, and be sure to contact your doctor if:  ? · You cough more deeply or more often, especially if you notice more mucus or a change in the color of your mucus. ? · You do not get better as expected. Where can you learn more? Go to http://lesly-ad.info/. Enter R823 in the search box to learn more about \"Chronic Cough: Care Instructions. \"  Current as of: May 12, 2017  Content Version: 11.4  © 5172-2559 [employer]. Care instructions adapted under license by [employer] (which disclaims liability or warranty for this information). If you have questions about a medical condition or this instruction, always ask your healthcare professional. [address] any warranty or liability for your use of this information.
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
## 2018-01-07 NOTE — ED TRIAGE NOTES
Reports that he's had a cough for the last few months, not getting better with medications. Seen several times at this ED and other facilities.  Reports the he wants a chest x-ray

## 2020-01-10 ENCOUNTER — HOSPITAL ENCOUNTER (EMERGENCY)
Age: 30
Discharge: HOME | End: 2020-01-10
Attending: EMERGENCY MEDICINE
Payer: MEDICAID

## 2020-01-10 ENCOUNTER — APPOINTMENT (OUTPATIENT)
Dept: GENERAL RADIOLOGY | Age: 30
End: 2020-01-10
Attending: EMERGENCY MEDICINE
Payer: MEDICAID

## 2020-01-10 VITALS
BODY MASS INDEX: 42.66 KG/M2 | TEMPERATURE: 98.3 F | WEIGHT: 315 LBS | DIASTOLIC BLOOD PRESSURE: 80 MMHG | HEART RATE: 80 BPM | RESPIRATION RATE: 18 BRPM | OXYGEN SATURATION: 99 % | SYSTOLIC BLOOD PRESSURE: 125 MMHG | HEIGHT: 72 IN

## 2020-01-10 DIAGNOSIS — S93.401A SPRAIN OF RIGHT ANKLE, UNSPECIFIED LIGAMENT, INITIAL ENCOUNTER: Primary | ICD-10-CM

## 2020-01-10 PROCEDURE — 99283 EMERGENCY DEPT VISIT LOW MDM: CPT

## 2020-01-10 PROCEDURE — 73610 X-RAY EXAM OF ANKLE: CPT

## 2020-01-10 NOTE — LETTER
[facility] [place] 
[facility] 
[address] [address] [phone] Work/School Note Date: 1/10/2020 To Whom It May concern: 
 
Nakul Rosas was seen and treated today in the emergency room by the following provider(s): 
Attending Provider: Tommy Berrios MD 
Physician Assistant: ZA Ward. Nakul Rosas may return to work on 1/13/20.  
 
Sincerely, 
 
 
 
 
ZA Gilmore

## 2020-01-10 NOTE — DISCHARGE INSTRUCTIONS
Patient Education        Ankle Sprain: Care Instructions  Your Care Instructions    An ankle sprain can happen when you twist your ankle. The ligaments that support the ankle can get stretched and torn. Often the ankle is swollen and painful. Ankle sprains may take from several weeks to several months to heal. Usually, the more pain and swelling you have, the more severe your ankle sprain is and the longer it will take to heal. You can heal faster and regain strength in your ankle with good home treatment. It is very important to give your ankle time to heal completely, so that you do not easily hurt your ankle again. Follow-up care is a key part of your treatment and safety. Be sure to make and go to all appointments, and call your doctor if you are having problems. It's also a good idea to know your test results and keep a list of the medicines you take. How can you care for yourself at home? · Prop up your foot on pillows as much as possible for the next 3 days. Try to keep your ankle above the level of your heart. This will help reduce the swelling. · Follow your doctor's directions for wearing a splint or elastic bandage. Wrapping the ankle may help reduce or prevent swelling. · Your doctor may give you a splint, a brace, an air stirrup, or another form of ankle support to protect your ankle until it is healed. Wear it as directed while your ankle is healing. Do not remove it unless your doctor tells you to. After your ankle has healed, ask your doctor whether you should wear the brace when you exercise. · Put ice or cold packs on your injured ankle for 10 to 20 minutes at a time. Try to do this every 1 to 2 hours for the next 3 days (when you are awake) or until the swelling goes down. Put a thin cloth between the ice and your skin. · You may need to use crutches until you can walk without pain. If you do use crutches, try to bear some weight on your injured ankle if you can do so without pain.  This helps the ankle heal.  · Take pain medicines exactly as directed. ? If the doctor gave you a prescription medicine for pain, take it as prescribed. ? If you are not taking a prescription pain medicine, ask your doctor if you can take an over-the-counter medicine. · If you have been given ankle exercises to do at home, do them exactly as instructed. These can promote healing and help prevent lasting weakness. When should you call for help? Call your doctor now or seek immediate medical care if:    · Your pain is getting worse.     · Your swelling is getting worse.     · Your splint feels too tight or you are unable to loosen it.    Watch closely for changes in your health, and be sure to contact your doctor if:    · You are not getting better after 1 week. Where can you learn more? Go to http://lesly-ad.info/. Enter J270 in the search box to learn more about \"Ankle Sprain: Care Instructions. \"  Current as of: June 26, 2019  Content Version: 12.2  © 2431-4159 [employer]. Care instructions adapted under license by [employer] (which disclaims liability or warranty for this information). If you have questions about a medical condition or this instruction, always ask your healthcare professional. [address] any warranty or liability for your use of this information.

## 2020-01-10 NOTE — ED NOTES
Discharge instructions reviewed with pt, pt verbalizes understanding, discharged in stable condition, states he does not want crutches
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
## 2020-09-08 ENCOUNTER — HOSPITAL ENCOUNTER (EMERGENCY)
Age: 30
Discharge: HOME | End: 2020-09-09
Attending: EMERGENCY MEDICINE
Payer: MEDICAID

## 2020-09-08 ENCOUNTER — APPOINTMENT (OUTPATIENT)
Dept: CT IMAGING | Age: 30
End: 2020-09-08
Attending: EMERGENCY MEDICINE
Payer: MEDICAID

## 2020-09-08 DIAGNOSIS — G89.29 CHRONIC FLANK PAIN: Primary | ICD-10-CM

## 2020-09-08 DIAGNOSIS — R10.9 CHRONIC FLANK PAIN: Primary | ICD-10-CM

## 2020-09-08 LAB
ALBUMIN SERPL-MCNC: 3.8 G/DL (ref 3.4–5)
ALBUMIN/GLOB SERPL: 0.8 {RATIO} (ref 0.8–1.7)
ALP SERPL-CCNC: 77 U/L (ref 45–117)
ALT SERPL-CCNC: 29 U/L (ref 16–61)
ANION GAP SERPL CALC-SCNC: 3 MMOL/L (ref 3–18)
APPEARANCE UR: CLEAR
AST SERPL-CCNC: 16 U/L (ref 10–38)
BASOPHILS # BLD: 0 K/UL (ref 0–0.1)
BASOPHILS NFR BLD: 1 % (ref 0–2)
BILIRUB SERPL-MCNC: 0.3 MG/DL (ref 0.2–1)
BILIRUB UR QL: NEGATIVE
BUN SERPL-MCNC: 11 MG/DL (ref 7–18)
BUN/CREAT SERPL: 10 (ref 12–20)
CALCIUM SERPL-MCNC: 9.2 MG/DL (ref 8.5–10.1)
CHLORIDE SERPL-SCNC: 105 MMOL/L (ref 100–111)
CO2 SERPL-SCNC: 31 MMOL/L (ref 21–32)
COLOR UR: YELLOW
CREAT SERPL-MCNC: 1.12 MG/DL (ref 0.6–1.3)
DIFFERENTIAL METHOD BLD: ABNORMAL
EOSINOPHIL # BLD: 0.3 K/UL (ref 0–0.4)
EOSINOPHIL NFR BLD: 5 % (ref 0–5)
ERYTHROCYTE [DISTWIDTH] IN BLOOD BY AUTOMATED COUNT: 15.9 % (ref 11.6–14.5)
GLOBULIN SER CALC-MCNC: 4.5 G/DL (ref 2–4)
GLUCOSE SERPL-MCNC: 96 MG/DL (ref 74–99)
GLUCOSE UR STRIP.AUTO-MCNC: NEGATIVE MG/DL
HCT VFR BLD AUTO: 45.2 % (ref 36–48)
HGB BLD-MCNC: 14.1 G/DL (ref 13–16)
HGB UR QL STRIP: NEGATIVE
KETONES UR QL STRIP.AUTO: NEGATIVE MG/DL
LEUKOCYTE ESTERASE UR QL STRIP.AUTO: NEGATIVE
LYMPHOCYTES # BLD: 2.1 K/UL (ref 0.9–3.6)
LYMPHOCYTES NFR BLD: 33 % (ref 21–52)
MCH RBC QN AUTO: 27.6 PG (ref 24–34)
MCHC RBC AUTO-ENTMCNC: 31.2 G/DL (ref 31–37)
MCV RBC AUTO: 88.5 FL (ref 74–97)
MONOCYTES # BLD: 0.6 K/UL (ref 0.05–1.2)
MONOCYTES NFR BLD: 9 % (ref 3–10)
NEUTS SEG # BLD: 3.5 K/UL (ref 1.8–8)
NEUTS SEG NFR BLD: 52 % (ref 40–73)
NITRITE UR QL STRIP.AUTO: NEGATIVE
PH UR STRIP: 7 [PH] (ref 5–8)
PLATELET # BLD AUTO: 253 K/UL (ref 135–420)
PMV BLD AUTO: 9.8 FL (ref 9.2–11.8)
POTASSIUM SERPL-SCNC: 3.9 MMOL/L (ref 3.5–5.5)
PROT SERPL-MCNC: 8.3 G/DL (ref 6.4–8.2)
PROT UR STRIP-MCNC: NEGATIVE MG/DL
RBC # BLD AUTO: 5.11 M/UL (ref 4.7–5.5)
SODIUM SERPL-SCNC: 139 MMOL/L (ref 136–145)
SP GR UR REFRACTOMETRY: 1.02 (ref 1–1.03)
UROBILINOGEN UR QL STRIP.AUTO: 1 EU/DL (ref 0.2–1)
WBC # BLD AUTO: 6.5 K/UL (ref 4.6–13.2)

## 2020-09-08 PROCEDURE — 74011250636 HC RX REV CODE- 250/636: Performed by: EMERGENCY MEDICINE

## 2020-09-08 PROCEDURE — 74011000250 HC RX REV CODE- 250: Performed by: EMERGENCY MEDICINE

## 2020-09-08 PROCEDURE — 93005 ELECTROCARDIOGRAM TRACING: CPT

## 2020-09-08 PROCEDURE — 85025 COMPLETE CBC W/AUTO DIFF WBC: CPT

## 2020-09-08 PROCEDURE — 80053 COMPREHEN METABOLIC PANEL: CPT

## 2020-09-08 PROCEDURE — 83690 ASSAY OF LIPASE: CPT

## 2020-09-08 PROCEDURE — 99285 EMERGENCY DEPT VISIT HI MDM: CPT

## 2020-09-08 PROCEDURE — 96374 THER/PROPH/DIAG INJ IV PUSH: CPT

## 2020-09-08 PROCEDURE — 81003 URINALYSIS AUTO W/O SCOPE: CPT

## 2020-09-08 PROCEDURE — 74176 CT ABD & PELVIS W/O CONTRAST: CPT

## 2020-09-08 RX ORDER — METHOCARBAMOL 500 MG/1
1 TABLET, FILM COATED ORAL 4 TIMES DAILY
COMMUNITY
Start: 2020-09-03 | End: 2021-09-05

## 2020-09-08 RX ORDER — KETOROLAC TROMETHAMINE 15 MG/ML
15 INJECTION, SOLUTION INTRAMUSCULAR; INTRAVENOUS
Status: COMPLETED | OUTPATIENT
Start: 2020-09-08 | End: 2020-09-08

## 2020-09-08 RX ORDER — LIDOCAINE 4 G/100G
1 PATCH TOPICAL EVERY 24 HOURS
Status: DISCONTINUED | OUTPATIENT
Start: 2020-09-08 | End: 2020-09-09 | Stop reason: HOSPADM

## 2020-09-08 RX ADMIN — KETOROLAC TROMETHAMINE 15 MG: 15 INJECTION, SOLUTION INTRAMUSCULAR; INTRAVENOUS at 23:52

## 2020-09-09 VITALS
SYSTOLIC BLOOD PRESSURE: 154 MMHG | HEIGHT: 72 IN | BODY MASS INDEX: 42.66 KG/M2 | OXYGEN SATURATION: 95 % | DIASTOLIC BLOOD PRESSURE: 83 MMHG | WEIGHT: 315 LBS | TEMPERATURE: 97.3 F | HEART RATE: 99 BPM | RESPIRATION RATE: 24 BRPM

## 2020-09-09 LAB — LIPASE SERPL-CCNC: 163 U/L (ref 73–393)

## 2020-09-09 RX ORDER — IBUPROFEN 400 MG/1
400 TABLET ORAL
Qty: 20 TAB | Refills: 0 | Status: SHIPPED | OUTPATIENT
Start: 2020-09-09 | End: 2021-09-05

## 2020-09-09 RX ORDER — LIDOCAINE 50 MG/G
PATCH TOPICAL
Qty: 15 EACH | Refills: 0 | Status: SHIPPED | OUTPATIENT
Start: 2020-09-09 | End: 2021-09-05

## 2020-09-09 RX ORDER — ACETAMINOPHEN 500 MG
1000 TABLET ORAL
Qty: 30 TAB | Refills: 0 | Status: SHIPPED | OUTPATIENT
Start: 2020-09-09 | End: 2021-09-05

## 2020-09-09 NOTE — ED NOTES
I have reviewed discharge instructions with the patient. The patient verbalized understanding. AOx4. NAD. VSS. Ambulatory with steady gait to lobby. Discharge medications reviewed with patient and appropriate educational materials and side effects teaching were provided.
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
## 2020-09-09 NOTE — ED TRIAGE NOTES
Pt co RLQ x 2 wks, which progressed to his R flank in the last week. Pt reports pain is now constant, rates 10/10. Denies CP, SOB, N/V/D, fever, dysuria, hematuria, freq.

## 2020-09-10 ENCOUNTER — TELEPHONE (OUTPATIENT)
Dept: CASE MANAGEMENT | Age: 30
End: 2020-09-10

## 2020-09-10 LAB
ATRIAL RATE: 99 BPM
CALCULATED P AXIS, ECG09: 40 DEGREES
CALCULATED R AXIS, ECG10: 23 DEGREES
CALCULATED T AXIS, ECG11: 37 DEGREES
DIAGNOSIS, 93000: NORMAL
P-R INTERVAL, ECG05: 180 MS
Q-T INTERVAL, ECG07: 342 MS
QRS DURATION, ECG06: 84 MS
QTC CALCULATION (BEZET), ECG08: 438 MS
VENTRICULAR RATE, ECG03: 99 BPM

## 2020-09-10 NOTE — TELEPHONE ENCOUNTER
Patient contacted regarding recent discharge and COVID-19 risk   Care Coordinator contacted the patient by telephone to perform post discharge assessment. Verified name and  with patient as identifiers. Patient has following risk factors of: prediabetic. Care Coordinator reviewed discharge instructions, medical action plan and red flags related to discharge diagnosis. Reviewed and educated them on any new and changed medications related to discharge diagnosis. Advised obtaining a 90-day supply of all daily and as-needed medications. Education provided regarding infection prevention, and signs and symptoms of COVID-19 and when to seek medical attention with patient who verbalized understanding. Discussed exposure protocols and quarantine from [address] you at higher risk for severe illness  and given an opportunity for questions and concerns. The patient agrees to contact the COVID-19 hotline [phone] or PCP office for questions related to their healthcare. Care Coordinator provided contact information for future reference. From CDC: Are you at higher risk for severe illness?  Wash your hands often.  Avoid close contact (6 feet, which is about two arm lengths) with people who are sick.  Put distance between yourself and other people if COVID-19 is spreading in your community.  Clean and disinfect frequently touched surfaces.  Avoid all cruise travel and non-essential air travel.  Call your healthcare professional if you have concerns about COVID-19 and your underlying condition or if you are sick. For more information on steps you can take to protect yourself, see CDC's How to Protect Yourself      Patient/family/caregiver given information for [employer] Loop and agrees to enroll no  Patient's preferred e-mail:    Patient's preferred phone number:   Based on Loop alert triggers, patient will be contacted by nurse care manager for worsening symptoms.     Plan for follow-up call in 7-14 days based on severity of symptoms and risk factors.

## 2020-10-02 ENCOUNTER — TELEPHONE (OUTPATIENT)
Dept: CASE MANAGEMENT | Age: 30
End: 2020-10-02

## 2020-10-02 NOTE — TELEPHONE ENCOUNTER
Patient resolved from Transition of Care episode on 10/2/2020  Discussed COVID-19 related testing which was available at this time. Test results were not done. Patient informed of results, if available? Unable to reach patient left voice message with the information provided below     Patient/family has been provided the following resources and education related to COVID-19:                         Signs, symptoms and red flags related to COVID-19            CDC exposure and quarantine guidelines            [facility]it exposure contact - [phone]            Contact for their local Department of Health             . No further outreach scheduled with this CTN/ACM/LPN/HC/ MA. Episode of Care resolved. Patient has this CTN/ACM/LPN/HC/MA contact information if future needs arise.

## 2021-07-15 ENCOUNTER — TELEPHONE (OUTPATIENT)
Dept: SURGERY | Age: 31
End: 2021-07-15

## 2021-09-05 ENCOUNTER — APPOINTMENT (OUTPATIENT)
Dept: GENERAL RADIOLOGY | Age: 31
End: 2021-09-05
Attending: EMERGENCY MEDICINE
Payer: MEDICAID

## 2021-09-05 PROCEDURE — 99282 EMERGENCY DEPT VISIT SF MDM: CPT

## 2021-09-05 PROCEDURE — 71046 X-RAY EXAM CHEST 2 VIEWS: CPT

## 2021-09-05 RX ORDER — METFORMIN HYDROCHLORIDE 500 MG/1
500 TABLET ORAL 2 TIMES DAILY WITH MEALS
COMMUNITY
End: 2022-07-08

## 2021-09-05 RX ORDER — AMLODIPINE BESYLATE 10 MG/1
10 TABLET ORAL DAILY
COMMUNITY
End: 2022-10-11 | Stop reason: ALTCHOICE

## 2021-09-05 RX ORDER — SOLIFENACIN SUCCINATE 10 MG/1
10 TABLET, FILM COATED ORAL DAILY
COMMUNITY
End: 2022-03-09

## 2021-09-06 ENCOUNTER — HOSPITAL ENCOUNTER (EMERGENCY)
Age: 31
Discharge: HOME | End: 2021-09-06
Attending: EMERGENCY MEDICINE
Payer: MEDICAID

## 2021-09-06 VITALS
WEIGHT: 315 LBS | BODY MASS INDEX: 42.66 KG/M2 | TEMPERATURE: 99 F | HEIGHT: 72 IN | HEART RATE: 95 BPM | SYSTOLIC BLOOD PRESSURE: 154 MMHG | RESPIRATION RATE: 18 BRPM | DIASTOLIC BLOOD PRESSURE: 89 MMHG | OXYGEN SATURATION: 98 %

## 2021-09-06 DIAGNOSIS — R05.9 COUGH: Primary | ICD-10-CM

## 2021-09-06 DIAGNOSIS — Z20.822 PERSON UNDER INVESTIGATION FOR COVID-19: ICD-10-CM

## 2021-09-06 DIAGNOSIS — I10 ESSENTIAL HYPERTENSION: ICD-10-CM

## 2021-09-06 RX ORDER — FAMOTIDINE 20 MG/1
40 TABLET, FILM COATED ORAL
Status: DISCONTINUED | OUTPATIENT
Start: 2021-09-06 | End: 2021-09-06 | Stop reason: HOSPADM

## 2021-09-06 RX ORDER — DICYCLOMINE HYDROCHLORIDE 10 MG/1
20 CAPSULE ORAL
Status: DISCONTINUED | OUTPATIENT
Start: 2021-09-06 | End: 2021-09-06 | Stop reason: HOSPADM

## 2021-09-06 RX ORDER — AZITHROMYCIN 250 MG/1
500 TABLET, FILM COATED ORAL
Status: DISCONTINUED | OUTPATIENT
Start: 2021-09-06 | End: 2021-09-06 | Stop reason: HOSPADM

## 2021-09-06 RX ORDER — AZITHROMYCIN 250 MG/1
TABLET, FILM COATED ORAL
Qty: 4 TABLET | Refills: 0 | OUTPATIENT
Start: 2021-09-06 | End: 2021-11-17

## 2021-09-06 RX ORDER — FAMOTIDINE 20 MG/1
40 TABLET, FILM COATED ORAL 2 TIMES DAILY
Qty: 20 TABLET | Refills: 0 | Status: SHIPPED | OUTPATIENT
Start: 2021-09-06 | End: 2021-09-11

## 2021-09-06 RX ORDER — CALCIUM CARBONATE 750 MG/1
1 TABLET, CHEWABLE ORAL 3 TIMES DAILY
Qty: 40 TABLET | Refills: 0 | OUTPATIENT
Start: 2021-09-06 | End: 2022-03-05

## 2021-09-06 RX ORDER — DICYCLOMINE HYDROCHLORIDE 10 MG/1
20 CAPSULE ORAL
Qty: 60 CAPSULE | Refills: 0 | Status: SHIPPED | OUTPATIENT
Start: 2021-09-06 | End: 2022-04-25 | Stop reason: ALTCHOICE
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
## 2021-09-06 NOTE — ED TRIAGE NOTES
Patient arrives ambulatory to ED with c/c of nagging cough and nagging chest pain that has been going on for 2 weeks. Patient reports last week he received his 2nd dose of the Pfizer vaccine.

## 2021-09-07 ENCOUNTER — HOSPITAL ENCOUNTER (OUTPATIENT)
Dept: LAB | Age: 31
Discharge: HOME | End: 2021-09-07
Payer: MEDICAID

## 2021-09-07 LAB
COVID-19 RAPID TEST, COVR: NOT DETECTED
SOURCE, COVRS: NORMAL

## 2021-09-07 PROCEDURE — 87635 SARS-COV-2 COVID-19 AMP PRB: CPT

## 2021-11-17 ENCOUNTER — HOSPITAL ENCOUNTER (EMERGENCY)
Age: 31
Discharge: HOME | End: 2021-11-17
Attending: EMERGENCY MEDICINE
Payer: MEDICAID

## 2021-11-17 VITALS
RESPIRATION RATE: 16 BRPM | BODY MASS INDEX: 42.66 KG/M2 | TEMPERATURE: 97.8 F | WEIGHT: 315 LBS | HEART RATE: 94 BPM | HEIGHT: 72 IN | OXYGEN SATURATION: 98 % | SYSTOLIC BLOOD PRESSURE: 164 MMHG | DIASTOLIC BLOOD PRESSURE: 109 MMHG

## 2021-11-17 DIAGNOSIS — I10 ESSENTIAL HYPERTENSION: ICD-10-CM

## 2021-11-17 DIAGNOSIS — L73.2 HIDRADENITIS SUPPURATIVA: Primary | ICD-10-CM

## 2021-11-17 DIAGNOSIS — R51.9 ACUTE NONINTRACTABLE HEADACHE, UNSPECIFIED HEADACHE TYPE: ICD-10-CM

## 2021-11-17 DIAGNOSIS — E66.01 MORBID OBESITY (HCC): ICD-10-CM

## 2021-11-17 PROCEDURE — 99283 EMERGENCY DEPT VISIT LOW MDM: CPT

## 2021-11-17 RX ORDER — BUTALBITAL, ACETAMINOPHEN AND CAFFEINE 300; 40; 50 MG/1; MG/1; MG/1
1 CAPSULE ORAL
Qty: 20 CAPSULE | Refills: 0 | OUTPATIENT
Start: 2021-11-17 | End: 2022-03-05

## 2021-11-17 RX ORDER — SULFAMETHOXAZOLE AND TRIMETHOPRIM 800; 160 MG/1; MG/1
1 TABLET ORAL 2 TIMES DAILY
Qty: 14 TABLET | Refills: 0 | Status: SHIPPED | OUTPATIENT
Start: 2021-11-17 | End: 2021-11-24

## 2021-11-17 NOTE — DISCHARGE INSTRUCTIONS
Warm compresses to the back of the head and neck  Talk to your dermatologist regarding recurrence of this issue  Make sure that you get your eyes checked as discussed  Make sure that you get your sleep apnea studies completed  Antibiotic/headache medicine as prescribed  Make sure you are taking your blood pressure medicine  Tylenol or Motrin for mild to moderate headache
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
## 2022-01-07 ENCOUNTER — OFFICE VISIT (OUTPATIENT)
Dept: SURGERY | Age: 32
End: 2022-01-07
Payer: MEDICAID

## 2022-01-07 VITALS
HEART RATE: 107 BPM | RESPIRATION RATE: 16 BRPM | OXYGEN SATURATION: 99 % | HEIGHT: 72 IN | DIASTOLIC BLOOD PRESSURE: 95 MMHG | WEIGHT: 315 LBS | BODY MASS INDEX: 42.66 KG/M2 | TEMPERATURE: 97.9 F | SYSTOLIC BLOOD PRESSURE: 155 MMHG

## 2022-01-07 DIAGNOSIS — E66.01 MORBID OBESITY (HCC): Primary | ICD-10-CM

## 2022-01-07 DIAGNOSIS — E66.01 MORBID OBESITY WITH BODY MASS INDEX (BMI) OF 60.0 TO 69.9 IN ADULT (HCC): ICD-10-CM

## 2022-01-07 DIAGNOSIS — G47.30 SLEEP APNEA, UNSPECIFIED TYPE: ICD-10-CM

## 2022-01-07 DIAGNOSIS — I10 PRIMARY HYPERTENSION: ICD-10-CM

## 2022-01-07 DIAGNOSIS — E11.9 TYPE 2 DIABETES MELLITUS WITHOUT COMPLICATION, WITHOUT LONG-TERM CURRENT USE OF INSULIN (HCC): ICD-10-CM

## 2022-01-07 DIAGNOSIS — K21.9 GASTROESOPHAGEAL REFLUX DISEASE WITHOUT ESOPHAGITIS: ICD-10-CM

## 2022-01-07 PROCEDURE — 99205 OFFICE O/P NEW HI 60 MIN: CPT | Performed by: SPECIALIST

## 2022-01-07 RX ORDER — OMEPRAZOLE 20 MG/1
20 CAPSULE, DELAYED RELEASE ORAL DAILY
COMMUNITY
End: 2022-03-05

## 2022-01-07 NOTE — PATIENT INSTRUCTIONS

## 2022-01-07 NOTE — PROGRESS NOTES
Bariatric Surgery Consultation    Subjective: The patient is a 33 y.o. obese male with a Body mass index is 62.75 kg/m². .  The patient is at his heaviest weight for the past 12 years. he has been overweight since age 23.   he has been considering surgery since last several year. he desires surgery at this time because of multiple health concerns and their lifestyle issues which are hindered by their weight. he has been referred by his family physician Dr Alphonso Feliciano for evaluation and treatment of their obesity via surgical intervention. Deanna Wang has tried multiple diets in his lifetime most recently tried physician supervised, behavior modification, unsupervised diets and Atkins    Bariatric comorbidities present are   Patient Active Problem List   Diagnosis Code    Morbid obesity ([facility] E66.01    Anxiety F41.9    Chronic back pain M54.9, G89.29    Hypertension I10    Snores R06.83    GERD (gastroesophageal reflux disease) K21.9    Sleep disorder breathing G47.30    Diabetes mellitus ([facility] E11.9    Sleep apnea G47.30    Anemia D64.9    Morbid obesity with body mass index (BMI) of 60.0 to 69.9 in adult ([facility] E66.01, Z68.44       The patient is considering laparoscopic sleeve gastrectomy for surgical weight loss due to their ineffective progress with medical forms of weight loss and the urging of their physician who cares for their primary medical issues. The patient  now presents  for consideration for weight loss surgery understanding the benefits of this over a medical approach of weight loss as was discussed in our presentation on weight loss surgery. They have discussed their plans both with their family and primary care physician who is in support of their pursuit of such. The patient has not had health issues as of late and denies and gastrointestinal disturbances other than what is outlined below in their review of symptoms.  All of their prior evaluations available by both their PCP's and specialists physicians have been reviewed today either in the Care Everywhere portal or scanned under the media tab. I have spent a large portion of my initial consultation today reviewing the patients current dietary habits which have contributed to their health issues and obesity. I have suggested to them personally a dietary regimen that they can initiate now to help with their status as it pertains to their weight. They understand that the most important aspect of their journey through their weight loss endeavor will be their adherence to a new lifestyle of healthy eating behavior. They also understand that an adherence to an exercise program will not only help with weight loss but is ultimately important in weight maintenance. The patients goal weight is 230-240lb. These goals are consistent with expected outcomes of their desired operation. his Medical goals are resolution of these health issues. Patient Active Problem List    Diagnosis Date Noted    Diabetes mellitus ([facility]     Sleep apnea     Anemia     Morbid obesity with body mass index (BMI) of 60.0 to 69.9 in adult (HCC)     Morbid obesity (HCC)     Anxiety     Chronic back pain     Hypertension     Snores     GERD (gastroesophageal reflux disease)     Sleep disorder breathing      Past Surgical History:   Procedure Laterality Date    HX OTHER SURGICAL      resection of benign cyst from neck      Social History     Tobacco Use    Smoking status: Never Smoker    Smokeless tobacco: Never Used   Substance Use Topics    Alcohol use: No      Family History   Problem Relation Age of Onset    Arthritis-rheumatoid Mother       Current Outpatient Medications   Medication Sig Dispense Refill    omeprazole (PRILOSEC) 20 mg capsule Take 20 mg by mouth daily.  dicyclomine (BENTYL) 10 mg capsule Take 2 Capsules by mouth four (4) times daily as needed for Abdominal Cramps.  60 Capsule 0    amLODIPine (NORVASC) 10 mg tablet Take 10 mg by mouth daily.  metFORMIN (GLUCOPHAGE) 500 mg tablet Take 500 mg by mouth two (2) times daily (with meals).  butalbital-acetaminophen-caff (Fioricet) -40 mg per capsule Take 1 Capsule by mouth every four (4) hours as needed for Headache. (Patient not taking: Reported on 1/7/2022) 20 Capsule 0    calcium carbonate (Tums) 300 mg (750 mg) chewable tablet Take 1 Tablet by mouth three (3) times daily. (Patient not taking: Reported on 11/17/2021) 40 Tablet 0    solifenacin (VESICARE) 10 mg tablet Take 10 mg by mouth daily.  (Patient not taking: Reported on 1/7/2022)       Allergies   Allergen Reactions    Penicillins Other (comments)     Since he was a kid          Review of Systems:       General - No history or complaints of unexpected fever, chills, or weight loss  Head/Neck - No history or complaints of headache, diplopia, dysphagia, hearing loss  Cardiac - No history or complaints of chest pain, palpitations, murmur, or shortness of breath  Pulmonary - No history or complaints of shortness of breath, productive cough, hemoptysis  Gastrointestinal - moderate reflux,no  abdominal pain, obstipation/constipation or blood per rectum  Genitourinary - No history or complaints of hematuria/dysuria, stress urinary incontinence symptoms, or renal lithiasis  Musculoskeletal - mild joint pain in their knees,  no muscular weakness  Hematologic - No history or complaints of bleeding disorders,  No blood transfusions  Neurologic - No history or complaints of  migraine headaches, seizure activity, syncopal episodes, TIA or stroke  Integumentary - No history or complaints of rashes, abnormal nevi, skin cancer  Gynecological - n/a               Objective:     Visit Vitals  BP (!) 155/95   Pulse (!) 107   Temp 97.9 °F (36.6 °C)   Resp 16   Ht 6' (1.829 m)   Wt (!) 209.9 kg (462 lb 11.2 oz)   SpO2 99%   BMI 62.75 kg/m²       Physical Examination: General appearance - alert, well appearing, and in no distress and oriented to person, place, and time  Mental status - alert, oriented to person, place, and time, normal mood, behavior, speech, dress, motor activity, and thought processes  Eyes - pupils equal and reactive, extraocular eye movements intact, sclera anicteric, left eye normal, right eye normal  Ears - right ear normal, left ear normal  Nose - normal and patent, no erythema, discharge or polyps  Mouth - mucous membranes moist, pharynx normal without lesions  Neck - supple, no significant adenopathy  Lymphatics - no palpable lymphadenopathy, no hepatosplenomegaly  Chest - clear to auscultation, no wheezes, rales or rhonchi, symmetric air entry  Heart - normal rate, regular rhythm, normal S1, S2, no murmurs, rubs, clicks or gallops  Abdomen - soft, nontender, nondistended, no masses or organomegaly, moderate central obesity  Back exam - full range of motion, no tenderness, palpable spasm or pain on motion  Neurological - alert, oriented, normal speech, no focal findings or movement disorder noted  Musculoskeletal - no joint tenderness, deformity or swelling  Extremities - peripheral pulses normal, no pedal edema, no clubbing or cyanosis  Skin - normal coloration and turgor, no rashes, no suspicious skin lesions noted    Labs:       No results found for this or any previous visit (from the past 1440 hour(s)). Assessment:     Morbid obesity with comorbidity    Plan:     laparoscopic sleeve gastrectomy    This is a 33 y.o. male with a BMI of Body mass index is 62.75 kg/m². and the weight-related co-morbidties as noted below. Rebekah Sen meets the NIH criteria for bariatric surgery based upon the BMI of Body mass index is 62.75 kg/m². and multiple weight-related co-morbidties. Rebekah Sen has elected laparoscopic sleeve gastrectomy as his intervention of choice for treatment of morbid obestiy through surgical means secondary to its safety profile, rapid return to work  and decreases in operative risks over gastric bypass.     In the office today, following Randall's history and physical examination, a 30 minute discussion regarding the anatomic alterations for the laparoscopic sleeve gastrectomy was undertaken. The dietary expectations and the patient and physician dependent factors for success were thoroughly discussed, to include the need for interval follow-up and long-term dietary changes associated with success. The possible complications of the sleeve gastrectomy  were also discussed, to include;death, DVT/PE, staple line leak, bleeding, stricture formation, infection, nutritional deficiencies and sleeve dilation. Specific weight related outcomes for success were also discussed with an emphasis on careful and close follow-up with the first year and eating behavior modification as the baseline and cyclical hunger return. The patient expressed an understanding of the above factors, and his questions were answered in their entirety. In addition, the patient watched a seminar regarding obesity, surgical weight loss including, adjustable gastric band, gastric bypass, and sleeve gastrectomy. This discussion contrasted the different surgical techniques, mechanisms of actions and expected outcomes, and surgical and medical risks associated with each procedure. In office today we had a long question and answer session where each questions was answered until there were no additional questions. Today, the patient had all of his questions answered and desires to proceed with  bariatric surgery initially choosing sleeve gastrectomy as his surgical option. The patient will see me in 2 months to see if he has achieved his 20 to 30 pound weight loss goal.  He is very familiar with a low carbohydrate diet and has lost in excess of 100 pounds in the past with such a diet. In addition I discussed his level of reflux with him and it does appear that when the patient loses weight or is at a significantly lower weight his reflux goes away.   He does still wish to pursue the sleeve gastrectomy and I feel like it is certainly reasonable operation in the situation. Secondary Diagnoses:     Dietary Intervention  - The patient is currently scheduled to see or has been followed by a bariatric nutritionist for an attempt at preoperative weight loss as has been dictated by their insurance carrier. They will be assessed at various times during their follow up to evaluate their progress depending on the length of time that is required once again by their carrier. I have explained the importance of   preoperative weight loss and the benefits regarding lower surgical risk and also assisting the patient in reaching their weight loss goal. They understand also that they should participate in an  exercise program to assist in this weight loss. Finally they understand there is a physiologic benefit from the standpoint of hepatic volume reduction and reduction of central visceral adiposity   preoperatively. I have reiterated the importance of a low carbohydrate and high protein regimen to achieve their   stated goal. I have reviewed their current eating behavior prior to this encounter and explained to them in an exhaustive fashion the appropriate diet that they should adhere to. They have been   encouraged to loose weight pre operatively and understand it is our prerogative to cancel surgery or postpone their procedure in the event of significant weight gain. The patients weight loss goal pre operatively is 20-30 pounds. Adult Onset Diabetes - The patient has prasanna given a very low carbohydrate diet preoperatively along with instructions to monitor their blood sugars on a regular   daily basis. When  their surgery is performed  we will be monitoring the patient with sliding scale insulin and accuchecks.   Based on those values we will determine   whether the patient needs a reduction of those medications postoperatively or total removal of those medications on discharge. We will have the patient continue   accuchecks postoperatively while at home also and report to me or their family physician for appropriate adjustments as needed. The patient also understands   that in the event of uncontrolled blood sugar preoperatively that we may choose to postpone their surgery. Obstructive Sleep Apnea -The patient understands the association of sleep apnea and obesity and the additional risk that it caries related to post surgical complications. If they have not been tested for sleep apnea and I feel they are at increased risk for this diagnosis, then they will be scheduled for a consultation with a Pulmonologist for such. In the event that they josef this diagnosis we will have the patient bring their CPAP machine to the hospital for use both postoperatively in the PACU and on the floor at its appropriate setting.  We will have them continue using it while at home after surgery and follow up with their pulmonologist 6 months after to be retested to see if it can be discontinued at that time period. Hypertension - The patient has a clear understanding of how weight loss improves hypertension as a whole, but also they understand that there is a significant genetic component   to this disease process. We will monitor the patients blood pressure while in the hospital and the plan would be to continue those medications postoperatively. If a diuretic is being   used we will stop them on discharge to prevent dehydration particularly with the sleeve gastrectomy and the gastric bypass procedures. They will be instructed to monitor their blood   pressure postoperatively while at home and notify their primary care physician in the event of any significantly high or uncharacteristic readings. They understand that surgery may be  cancelled if their blood pressure is deemed out of control the day of surgery either by myself or the anesthesia department.   For this reason they must take their medications as directed   and monitor their blood pressure regularly working up until their surgical date. GERD -The patient understands that weight loss surgery is not a guaranteed cure for reflux disease but does understand the benefits that weight loss can have on reflux disease. They also understand that at the time of surgery the gastroesophageal junction will be evaluated for the presence of a diaphragmatic hernia. Hernias will be corrected always with the surgical procedure if possible and is deemed safe. The patient also understands that neither weight loss surgery nor repair of a diaphragmatic hernia repair guarantees the complete cessation of the disease. They also understand there is a possibility of recurrence of these hernias with a simple crural repair as is performed with these procedures. They understand they may have to continue their medications in the postoperative period. They have a good understanding that the gastric bypass procedure is better suited to total resolution of this issue and that neither the Lap Band or sleeve gastrectomy is considered a curative procedure as it pertains to this diagnosis.         Signed By: Glenn Boone MD     January 7, 2022

## 2022-01-20 ENCOUNTER — CLINICAL SUPPORT (OUTPATIENT)
Dept: SURGERY | Age: 32
End: 2022-01-20

## 2022-01-20 VITALS — WEIGHT: 315 LBS | HEIGHT: 72 IN | BODY MASS INDEX: 42.66 KG/M2

## 2022-01-20 DIAGNOSIS — E66.01 MORBID OBESITY (HCC): Primary | ICD-10-CM

## 2022-01-20 NOTE — PROGRESS NOTES
Patient's Name: Gabriel Stevenson   Age: 33 y.o. YOB: 1990   Sex: male    Date:  1/20/2022      Visit 1    Visit Vitals  Ht 6' (1.829 m)   Wt (!) 212.6 kg (468 lb 11.2 oz)   BMI 63.57 kg/m²       Pounds Lost since last month: 0 lbs. Pounds Gained since last month: 6 lbs. Starting Weight: 462.7 lbs. Previous Months Weight: N/A  Overall Pounds Lost: 0 lbs. Overall Pounds Gained: 6 lbs. Note Pt has a 20-30 lb pre-op weight loss goal.    Do you smoke? NO    Alcohol intake? NO  Number of drinks at a time:  0  Number of times a week: 0      Eating Habits and Behaviors    I reviewed Nutrition, Behavior, and Exercise changes to start working on. Some of the eating behaviors that we discussed included:  Meal timing, meal planning and preparation, portion sizes, the importance of adequate protein intake, as well as limiting carbohydrate and fat intake for optimal weight loss. We also talked about avoiding liquid calories. Patient is encouraged to aim for 64 ounces of sugar-free, caffeine-free, and carbonation-free fluid per day, preferably from water, but also sugar-free beverages such as Crystal Light. Additionally, we discussed healthier options for dining out. Patient was encouraged to always request sauces and dressings on the side and request a salad, broth-based soups, or non-starchy vegetables in place of fries. Patient's current diet habits include: Reports switched to diet sodas, cut out diet fruit juice, frequently eats fast food/take-out d/t spouse works 12hr shifts and \"I don't really cook\". States frequently waking up at night and eating \"because what else am I going to do? I got a CPAP machine last month and I'm still getting used to it but sleeping better. \" Portion size estimates:  Protein - larger than pt's hand, vegs - pt's fist size, starches/other carbs - pt's fist size;  Pt reports being very familiar with a low carbohydrate diet and has lost in excess of 100 pounds in the past \"following a keto diet and lifting weights\". 6:00-7:00 AM - UP  10:00 AM - BREAKFAST: leftovers  3:30-4 PM - LUNCH: double cheeseburger, small fries, lg Hi-C orange drink (no ice) - [employer] OR leftovers OR can of beef-a-sebastien/ravioli  8 PM - DINNER: beef and broccoli w/ yellow rice OR spaghetti w/ meat sauce, 1 sl garlic bread, salad w/ grilled chicken, caesar dressing  DESSERT: cookies (3), chocolate ice cream bar (2-3) - not regularly  SNACKS: chex mix, ramen noodles  FLUIDS: diet root beer/ginger ale/sunkist (3-4 12-oz cans/d), water (3-4 bottles/d - 16.9 oz)    Protein supplement intake: None      Physical Activity/Exercise    Comments: We talked about exercise. Patient was given reasons of why exercise is so important and how that can help with their long-term success. Discussed goal of 150 minutes of vigorous activity/wk and to include purposeful activity, such as parking far in the grocery store lot, etc. I have encouraged patient to get a support system to help with the activity. Currently for activity, patient is doing a virtual reality boxing workout for ~10 min, 7d/wk. States, \"That's about all I can do right now. It's no joke. \"  Pt reports he is also trying to get a job working in [employer], which he has done before and states it was very strenuous. Behavior Modification       Comments: We also talked about behavior modifications. We talked about eating triggers, such as eating in front of the TV and solutions, such as making the TV a no eating zone. If patient is eating out of emotion, food will only temporarily solve that. Patient is encouraged to HALT and assess if they are eating because they are Hungry, or out of emotions: Anxious, Lonely, Tired, which the food will only temporarily solve. We also talked about ways to prevent relapse. Goals that patient wants to work on include:  1. Aim for 150 min/wk vigorous physical activity by the next appt.   2. Limit carbohydrate intake, including sweets, Hi-C orange drink, bread, rice, noodles, cereal, starchy vegetables, and fruits  3. Pt will weigh/measure food portions, following portion size guidelines as discussed during appt today, and record in a food journal or meal tracking jared and bring to next appt for review. 4. Eat first meal within 2 hrs of waking and consume a protein-rich, low-fat, low-carb meal/snack every 3-4 hrs while awake, up to 2 hrs before bedtime. 5. Pt will find 2 protein supplements from approved list that he will drink post-operatively, and use as a meal replacement or high protein snack up to 2x/d to facilitate pre-op weight loss. Handouts Provided:     [x] Bariatric Surgery Criteria Packet  [] \"Nut for Nuts? Be careful! \"  [x] Meal Guide Handout  [] Carb Counting  [] Protein content of Foods  [] Snack Suggestions  [] BD Starches Packet  [] Protein Chart  [] Post-op Education Packet  [] Overview of Vitamins & Minerals TIMELINE  [x] Other    \"Preparing for WLS - <100 g CHO/d Eating Plan\"  [] None    Follow-up: Pt scheduled for nutrition education on 2/17/22. Pt was given RD contact information for nutrition-related questions. All current questions answered.     Gómez Ram, MANDI  1/20/2022

## 2022-03-05 ENCOUNTER — HOSPITAL ENCOUNTER (EMERGENCY)
Age: 32
Discharge: HOME | End: 2022-03-05
Attending: EMERGENCY MEDICINE
Payer: MEDICAID

## 2022-03-05 ENCOUNTER — APPOINTMENT (OUTPATIENT)
Dept: CT IMAGING | Age: 32
End: 2022-03-05
Attending: PHYSICIAN ASSISTANT
Payer: MEDICAID

## 2022-03-05 VITALS
TEMPERATURE: 97.7 F | HEART RATE: 76 BPM | BODY MASS INDEX: 42.66 KG/M2 | WEIGHT: 315 LBS | SYSTOLIC BLOOD PRESSURE: 161 MMHG | RESPIRATION RATE: 14 BRPM | OXYGEN SATURATION: 100 % | DIASTOLIC BLOOD PRESSURE: 98 MMHG | HEIGHT: 72 IN

## 2022-03-05 DIAGNOSIS — Z87.19 HISTORY OF GASTROESOPHAGEAL REFLUX (GERD): ICD-10-CM

## 2022-03-05 DIAGNOSIS — R10.12 ABDOMINAL PAIN, LUQ (LEFT UPPER QUADRANT): Primary | ICD-10-CM

## 2022-03-05 LAB
ALBUMIN SERPL-MCNC: 3.2 G/DL (ref 3.4–5)
ALBUMIN/GLOB SERPL: 0.7 {RATIO} (ref 0.8–1.7)
ALP SERPL-CCNC: 57 U/L (ref 45–117)
ALT SERPL-CCNC: 27 U/L (ref 16–61)
ANION GAP SERPL CALC-SCNC: 3 MMOL/L (ref 3–18)
APPEARANCE UR: CLEAR
AST SERPL-CCNC: 15 U/L (ref 10–38)
BASOPHILS # BLD: 0 K/UL (ref 0–0.1)
BASOPHILS NFR BLD: 0 % (ref 0–2)
BILIRUB SERPL-MCNC: 0.2 MG/DL (ref 0.2–1)
BILIRUB UR QL: NEGATIVE
BUN SERPL-MCNC: 11 MG/DL (ref 7–18)
BUN/CREAT SERPL: 11 (ref 12–20)
CALCIUM SERPL-MCNC: 8.9 MG/DL (ref 8.5–10.1)
CHLORIDE SERPL-SCNC: 108 MMOL/L (ref 100–111)
CO2 SERPL-SCNC: 31 MMOL/L (ref 21–32)
COLOR UR: YELLOW
CREAT SERPL-MCNC: 0.97 MG/DL (ref 0.6–1.3)
DIFFERENTIAL METHOD BLD: ABNORMAL
EOSINOPHIL # BLD: 0.3 K/UL (ref 0–0.4)
EOSINOPHIL NFR BLD: 6 % (ref 0–5)
ERYTHROCYTE [DISTWIDTH] IN BLOOD BY AUTOMATED COUNT: 14.7 % (ref 11.6–14.5)
GLOBULIN SER CALC-MCNC: 4.4 G/DL (ref 2–4)
GLUCOSE SERPL-MCNC: 97 MG/DL (ref 74–99)
GLUCOSE UR STRIP.AUTO-MCNC: NEGATIVE MG/DL
HCT VFR BLD AUTO: 44.4 % (ref 36–48)
HGB BLD-MCNC: 13.4 G/DL (ref 13–16)
HGB UR QL STRIP: NEGATIVE
IMM GRANULOCYTES # BLD AUTO: 0 K/UL (ref 0–0.04)
IMM GRANULOCYTES NFR BLD AUTO: 0 % (ref 0–0.5)
KETONES UR QL STRIP.AUTO: NEGATIVE MG/DL
LEUKOCYTE ESTERASE UR QL STRIP.AUTO: NEGATIVE
LIPASE SERPL-CCNC: 287 U/L (ref 73–393)
LYMPHOCYTES # BLD: 1.9 K/UL (ref 0.9–3.6)
LYMPHOCYTES NFR BLD: 38 % (ref 21–52)
MCH RBC QN AUTO: 26.6 PG (ref 24–34)
MCHC RBC AUTO-ENTMCNC: 30.2 G/DL (ref 31–37)
MCV RBC AUTO: 88.1 FL (ref 78–100)
MONOCYTES # BLD: 0.5 K/UL (ref 0.05–1.2)
MONOCYTES NFR BLD: 11 % (ref 3–10)
NEUTS SEG # BLD: 2.2 K/UL (ref 1.8–8)
NEUTS SEG NFR BLD: 45 % (ref 40–73)
NITRITE UR QL STRIP.AUTO: NEGATIVE
NRBC # BLD: 0 K/UL (ref 0–0.01)
NRBC BLD-RTO: 0 PER 100 WBC
PH UR STRIP: 5.5 [PH] (ref 5–8)
PLATELET # BLD AUTO: 229 K/UL (ref 135–420)
PMV BLD AUTO: 10.5 FL (ref 9.2–11.8)
POTASSIUM SERPL-SCNC: 4 MMOL/L (ref 3.5–5.5)
PROT SERPL-MCNC: 7.6 G/DL (ref 6.4–8.2)
PROT UR STRIP-MCNC: NEGATIVE MG/DL
RBC # BLD AUTO: 5.04 M/UL (ref 4.35–5.65)
SODIUM SERPL-SCNC: 142 MMOL/L (ref 136–145)
SP GR UR REFRACTOMETRY: 1.02 (ref 1–1.03)
TROPONIN-HIGH SENSITIVITY: 4 NG/L (ref 0–78)
UROBILINOGEN UR QL STRIP.AUTO: 0.2 EU/DL (ref 0.2–1)
WBC # BLD AUTO: 4.9 K/UL (ref 4.6–13.2)

## 2022-03-05 PROCEDURE — 99285 EMERGENCY DEPT VISIT HI MDM: CPT

## 2022-03-05 PROCEDURE — C9113 INJ PANTOPRAZOLE SODIUM, VIA: HCPCS | Performed by: PHYSICIAN ASSISTANT

## 2022-03-05 PROCEDURE — 93005 ELECTROCARDIOGRAM TRACING: CPT

## 2022-03-05 PROCEDURE — 74011250636 HC RX REV CODE- 250/636: Performed by: PHYSICIAN ASSISTANT

## 2022-03-05 PROCEDURE — 96374 THER/PROPH/DIAG INJ IV PUSH: CPT

## 2022-03-05 PROCEDURE — 83690 ASSAY OF LIPASE: CPT

## 2022-03-05 PROCEDURE — 81003 URINALYSIS AUTO W/O SCOPE: CPT

## 2022-03-05 PROCEDURE — 74011250637 HC RX REV CODE- 250/637: Performed by: PHYSICIAN ASSISTANT

## 2022-03-05 PROCEDURE — 84484 ASSAY OF TROPONIN QUANT: CPT

## 2022-03-05 PROCEDURE — 74011000636 HC RX REV CODE- 636: Performed by: EMERGENCY MEDICINE

## 2022-03-05 PROCEDURE — 80053 COMPREHEN METABOLIC PANEL: CPT

## 2022-03-05 PROCEDURE — 74177 CT ABD & PELVIS W/CONTRAST: CPT

## 2022-03-05 PROCEDURE — 74011000250 HC RX REV CODE- 250: Performed by: PHYSICIAN ASSISTANT

## 2022-03-05 PROCEDURE — 85025 COMPLETE CBC W/AUTO DIFF WBC: CPT

## 2022-03-05 RX ORDER — PANTOPRAZOLE SODIUM 40 MG/10ML
40 INJECTION, POWDER, LYOPHILIZED, FOR SOLUTION INTRAVENOUS
Status: COMPLETED | OUTPATIENT
Start: 2022-03-05 | End: 2022-03-05

## 2022-03-05 RX ORDER — SUCRALFATE 1 G/1
1 TABLET ORAL 4 TIMES DAILY
Qty: 20 TABLET | Refills: 0 | Status: SHIPPED | OUTPATIENT
Start: 2022-03-05 | End: 2022-03-10

## 2022-03-05 RX ORDER — PANTOPRAZOLE SODIUM 40 MG/1
40 TABLET, DELAYED RELEASE ORAL 2 TIMES DAILY
Qty: 60 TABLET | Refills: 0 | Status: SHIPPED | OUTPATIENT
Start: 2022-03-05 | End: 2022-04-04

## 2022-03-05 RX ADMIN — PANTOPRAZOLE SODIUM 40 MG: 40 INJECTION, POWDER, FOR SOLUTION INTRAVENOUS at 11:14

## 2022-03-05 RX ADMIN — SODIUM CHLORIDE, POTASSIUM CHLORIDE, SODIUM LACTATE AND CALCIUM CHLORIDE 1000 ML: 600; 310; 30; 20 INJECTION, SOLUTION INTRAVENOUS at 11:14

## 2022-03-05 RX ADMIN — IOPAMIDOL 100 ML: 612 INJECTION, SOLUTION INTRAVENOUS at 12:10

## 2022-03-05 RX ADMIN — LIDOCAINE HYDROCHLORIDE 40 ML: 20 SOLUTION ORAL; TOPICAL at 11:14
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
## 2022-03-05 NOTE — ED TRIAGE NOTES
Patient c/o abdominal pain that has been going on for months.  The gi dr said it is gerd but the medications are not helping

## 2022-03-06 LAB
ATRIAL RATE: 76 BPM
CALCULATED P AXIS, ECG09: 26 DEGREES
CALCULATED R AXIS, ECG10: 17 DEGREES
CALCULATED T AXIS, ECG11: 34 DEGREES
DIAGNOSIS, 93000: NORMAL
P-R INTERVAL, ECG05: 202 MS
Q-T INTERVAL, ECG07: 372 MS
QRS DURATION, ECG06: 90 MS
QTC CALCULATION (BEZET), ECG08: 418 MS
VENTRICULAR RATE, ECG03: 76 BPM

## 2022-03-09 ENCOUNTER — DOCUMENTATION ONLY (OUTPATIENT)
Dept: SURGERY | Age: 32
End: 2022-03-09

## 2022-03-09 ENCOUNTER — TRANSCRIBE ORDER (OUTPATIENT)
Dept: REGISTRATION | Age: 32
End: 2022-03-09

## 2022-03-09 ENCOUNTER — OFFICE VISIT (OUTPATIENT)
Dept: SURGERY | Age: 32
End: 2022-03-09
Payer: MEDICAID

## 2022-03-09 ENCOUNTER — HOSPITAL ENCOUNTER (OUTPATIENT)
Dept: LAB | Age: 32
Discharge: HOME | End: 2022-03-09

## 2022-03-09 VITALS
WEIGHT: 315 LBS | HEART RATE: 115 BPM | BODY MASS INDEX: 42.66 KG/M2 | TEMPERATURE: 98.2 F | SYSTOLIC BLOOD PRESSURE: 158 MMHG | DIASTOLIC BLOOD PRESSURE: 104 MMHG | HEIGHT: 72 IN | OXYGEN SATURATION: 95 % | RESPIRATION RATE: 16 BRPM

## 2022-03-09 DIAGNOSIS — E66.01 MORBID OBESITY (HCC): ICD-10-CM

## 2022-03-09 DIAGNOSIS — G47.30 SLEEP APNEA, UNSPECIFIED TYPE: ICD-10-CM

## 2022-03-09 DIAGNOSIS — I10 PRIMARY HYPERTENSION: Primary | ICD-10-CM

## 2022-03-09 DIAGNOSIS — M54.50 CHRONIC LOW BACK PAIN, UNSPECIFIED BACK PAIN LATERALITY, UNSPECIFIED WHETHER SCIATICA PRESENT: ICD-10-CM

## 2022-03-09 DIAGNOSIS — G89.29 CHRONIC LOW BACK PAIN, UNSPECIFIED BACK PAIN LATERALITY, UNSPECIFIED WHETHER SCIATICA PRESENT: ICD-10-CM

## 2022-03-09 DIAGNOSIS — I10 PRIMARY HYPERTENSION: ICD-10-CM

## 2022-03-09 DIAGNOSIS — K21.9 GASTROESOPHAGEAL REFLUX DISEASE WITHOUT ESOPHAGITIS: ICD-10-CM

## 2022-03-09 DIAGNOSIS — E11.9 TYPE 2 DIABETES MELLITUS WITHOUT COMPLICATION, WITHOUT LONG-TERM CURRENT USE OF INSULIN (HCC): ICD-10-CM

## 2022-03-09 DIAGNOSIS — E66.01 MORBID OBESITY WITH BODY MASS INDEX (BMI) OF 60.0 TO 69.9 IN ADULT (HCC): ICD-10-CM

## 2022-03-09 DIAGNOSIS — D64.9 ANEMIA, UNSPECIFIED TYPE: ICD-10-CM

## 2022-03-09 LAB — XX-LABCORP SPECIMEN COL,LCBCF: NORMAL

## 2022-03-09 PROCEDURE — 99214 OFFICE O/P EST MOD 30 MIN: CPT | Performed by: SPECIALIST

## 2022-03-09 PROCEDURE — 99001 SPECIMEN HANDLING PT-LAB: CPT

## 2022-03-09 RX ORDER — IBUPROFEN 600 MG/1
TABLET ORAL
COMMUNITY
Start: 2021-12-01 | End: 2022-07-08

## 2022-03-09 NOTE — PATIENT INSTRUCTIONS

## 2022-03-09 NOTE — PROGRESS NOTES
Pt in limbo phase for sleeve (seen in office earlier today)    Called from office phone at [phone] - left message    Called in response to HTN today in office - 150/100 on two separate readings    Pt takes 10 mg Norvasc as only HTN medication    Instructed to call his PCP with these readings and discuss possible increase dosing or adding a second medication    Asked to call our office with any questions.

## 2022-03-09 NOTE — PROGRESS NOTES
Pre-Operative Progress Consultation  Original consult early Ja with a weight of 462 lbs  Transfer from [employer] office    Subjective:     Tirso Byers is a 33 y.o. obese male with a Body mass index is 62.56 kg/m². .  he desires surgery at this time because of health issues and quality of life issues. Tirso Byers has tried multiple diets in his lifetime most recently tried physician supervised, behavior modification and unsupervised diets. He was in the ER 4 days ago for stomach pain which he states was caused by \"gastritis\". A CT scan had the following reading;     1. No CT evidence of acute abdominal abnormality. Small umbilical hernia. Bariatric comorbidities present are   Patient Active Problem List   Diagnosis Code    Morbid obesity ([address] E66.01    Anxiety F41.9    Chronic back pain M54.9, G89.29    Hypertension I10    Snores R06.83    GERD (gastroesophageal reflux disease) K21.9    Sleep disorder breathing G47.30    Diabetes mellitus ([address] E11.9    Sleep apnea G47.30    Anemia D64.9    Morbid obesity with body mass index (BMI) of 60.0 to 69.9 in adult (HCC) E66.01, Z68.44     The patient desires laparoscopic sleeve gastrectomy for surgical weight loss. Tirso Byers is here today to check progress with weight loss / evaluate nutritional status and review all subspecialty clearances in hopes of proceeding to the operating room.      Patient Active Problem List    Diagnosis Date Noted    Diabetes mellitus ([address]     Sleep apnea     Anemia     Morbid obesity with body mass index (BMI) of 60.0 to 69.9 in adult ([facility]     Morbid obesity (HCC)     Anxiety     Chronic back pain     Hypertension     Snores     GERD (gastroesophageal reflux disease)     Sleep disorder breathing       Past Surgical History:   Procedure Laterality Date    HX OTHER SURGICAL      resection of benign cyst from neck      Social History     Tobacco Use    Smoking status: Never Smoker    Smokeless tobacco: Never Used Substance Use Topics    Alcohol use: No      Family History   Problem Relation Age of Onset    Arthritis-rheumatoid Mother       Current Outpatient Medications   Medication Sig Dispense Refill    ibuprofen (MOTRIN) 600 mg tablet       sucralfate (Carafate) 1 gram tablet Take 1 Tablet by mouth four (4) times daily for 5 days. 20 Tablet 0    pantoprazole (Protonix) 40 mg tablet Take 1 Tablet by mouth two (2) times a day for 30 days. 60 Tablet 0    dicyclomine (BENTYL) 10 mg capsule Take 2 Capsules by mouth four (4) times daily as needed for Abdominal Cramps. 60 Capsule 0    amLODIPine (NORVASC) 10 mg tablet Take 10 mg by mouth daily.  metFORMIN (GLUCOPHAGE) 500 mg tablet Take 500 mg by mouth two (2) times daily (with meals).        Allergies   Allergen Reactions    Penicillins Other (comments)     Since he was a kid          Review of Systems:        General - No history or complaints of unexpected fever, chills, or weight loss  Head/Neck - No history or complaints of headache, diplopia, dysphagia, hearing loss  Cardiac - No history or complaints of chest pain, palpitations, murmur, or shortness of breath  Pulmonary - No history or complaints of shortness of breath, productive cough, hemoptysis  Gastrointestinal - No history or complaints of reflux,  abdominal pain, obstipation/constipation, blood per rectum  Genitourinary - No history or complaints of hematuria/dysuria, stress urinary incontinence symptoms, or renal lithiasis  Musculoskeletal - No history or complaints of joint pain or muscular weakness  Hematologic - No history or complaints of bleeding disorders, blood transfusions, sickle cell anemia  Neurologic - No history or complaints of  migraine headaches, seizure activity, syncopal episodes, TIA or stroke  Integumentary - No history or complaints of rashes, abnormal nevi, skin cancer    Objective:     Visit Vitals  BP (!) 158/104 (BP 1 Location: Left lower arm, BP Patient Position: Sitting, BP Cuff Size: Large adult)   Pulse (!) 115   Temp 98.2 °F (36.8 °C)   Resp 16   Ht 6' (1.829 m)   Wt (!) 209.2 kg (461 lb 4.8 oz)   SpO2 95%   BMI 62.56 kg/m²     Physical Examination: General appearance - alert, well appearing, and in no distress  Mental status - alert, oriented to person, place, and time  Eyes - pupils equal and reactive, extraocular eye movements intact  Nose - normal and patent, no erythema, discharge or polyps  Mouth - mucous membranes moist, pharynx normal without lesions  Neck - supple, no significant adenopathy  Lymphatics - no palpable lymphadenopathy, no hepatosplenomegaly  Chest - clear to auscultation, no wheezes, rales or rhonchi, symmetric air entry  Heart - normal rate, regular rhythm, normal S1, S2, no murmurs, rubs, clicks or gallops  Abdomen - soft, nontender, nondistended, no masses or organomegaly  Back exam - full range of motion, no tenderness, palpable spasm or pain on motion  Neurological - alert, oriented, normal speech, no focal findings or movement disorder noted  Musculoskeletal - no joint tenderness, deformity or swelling  Extremities - peripheral pulses normal, no pedal edema, no clubbing or cyanosis  Skin - normal coloration and turgor, no rashes, no suspicious skin lesions noted    Labs:             Assessment:     Morbid obesity with associated comorbidity     Plan:     Continuation of Pre-Operative evaluation / clearance. Ofelia Martinez has returned to the office today to discuss his status as a surgical candidate. his progress has been noted and reviewed. We will continue the pre-operative process and work towards goals as outlined. he has 24 more pounds to lose before proceeding to the OR. (1 pounds lost since initial consult visit 2 months ago)    Ofelia Martinez understand the rationales for all the above. It has been discussed that given his obeses condition that the best surgical option for   this patient would be the laparoscopic sleeve gastrectomy.   Mary Pathak [place] Blanchard agrees with the surgical choice and has been educated in it's; risks,   benefits, and alternatives. We will continue with the pre-operative evaluation as needed to check progress. The patient understands the plan of action    [facility] but was given a 25 lb wt loss goal when he was seen here in Jan.  He has lost one of his requested 25 lbs. He has not had an UGI with this office. The plan going forward will be to proceed with surgery when he has completed his nutrition visits. Secondary Diagnoses:     Dietary Intervention  - The patient is currently scheduled to see or has been followed by a bariatric nutritionist for an attempt at preoperative weight loss as has been dictated by their insurance carrier. They will be assessed at various times during their follow up to evaluate their progress depending on the length of time that is required once again by their carrier. I have explained the importance of preoperative weight loss and the benefits regarding lower surgical risk and also assisting the patient in reaching their weight loss goal.  Finally they understand their is a physiologic benefit from the standpoint of hepatic volume reduction preoperatively. I have reiterated the importance of a low carbohydrate and high protein regimen to achieve their stated goal.    Hypertension - The patient has a clear understanding of how weight loss improves hypertension as a whole, but also they understand that there is a significant genetic component to this disease process.  We will monitor the patients blood pressure while in the hospital and the plan would be to continue those medications postoperatively.  If a diuretic is being used we will stop them on discharge to prevent dehydration particularly with the sleeve gastrectomy and the gastric bypass procedures.  They will be instructed to monitor their blood pressure postoperatively while at home and notify their primary care physician in the event of any significantly high or uncharacteristic readings. Adult Onset Diabetes - The patient has prasanna given a very low carbohydrate diet preoperatively along with instructions to monitor their blood sugars on a regular daily basis. When  their surgery is performed  we will be monitoring the patient with sliding scale insulin and accuchecks.  Based on those values we will determine whether the patient needs a reduction of those medications postoperatively or total removal of those medications on discharge.  We will have the patient continue accuchecks postoperatively while at home also and report to me or their family physician for appropriate adjustments as needed.  The patient also understands that in the event of uncontrolled blood sugar preoperatively that we may choose to postpone their surgery. Weight Related Arthritis -The patient understands the benefits that weight loss surgery can have on their arthritis but also understands that weight loss is not a guaranteed cure and relief of symptoms is often dependent on the severity of the underlying disease.  The patient also understands that traditional pharmaceutical treatments for this diagnosis are usually unavailable to post-operative weight loss patients due to the effects on the gastrointestinal tract particularly with the gastric bypass and to a lesser effect with the sleeve gastrectomy.  Any changes to the patients medication treatment will ultimately be made the patients PCP with input by our office. Obstructive Sleep Apnea -The patient understands the association of sleep apnea and obesity and the additional risk that it caries related to post surgical complications. We will have the patient bring their CPAP machine to the hospital for use both postoperatively in the PACU and on the floor at its appropriate setting.  We will have them continue using it while at home after surgery and follow up with their pulmonologist 6 months after to be retested to see if it can be discontinued at that time period.       Signed By: ZA Licona     March 9, 2022

## 2022-03-10 LAB
T4 FREE SERPL-MCNC: 0.98 NG/DL (ref 0.82–1.77)
TSH SERPL DL<=0.005 MIU/L-ACNC: 2.17 UIU/ML (ref 0.45–4.5)

## 2022-03-23 ENCOUNTER — CLINICAL SUPPORT (OUTPATIENT)
Dept: SURGERY | Age: 32
End: 2022-03-23

## 2022-03-23 VITALS — HEIGHT: 72 IN | BODY MASS INDEX: 42.66 KG/M2 | WEIGHT: 315 LBS

## 2022-03-23 DIAGNOSIS — E66.01 MORBID OBESITY (HCC): Primary | ICD-10-CM

## 2022-03-23 NOTE — PROGRESS NOTES
Patient's Name: Ginette Doherty   Age: 33 y.o. YOB: 1990   Sex: male    Date:  3/23/2022      Visit 2      Visit Vitals  Ht 6' (1.829 m)   Wt (!) 207.1 kg (456 lb 9.6 oz)   BMI 61.93 kg/m²       Pounds Lost since last month: 12.1 lbs. (1/20/22)              Pounds Gained since last month: 0 lbs. Starting Weight: 462.7 lbs. Previous Months Weight: 456.6 lbs. (1/20/22)  Overall Pounds Lost: 6.1 lbs. Overall Pounds Gained:  lbs. Do you smoke? No    Alcohol intake? No  Number of drinks at a time:  0  Number of times a week: 0      Eating Habits and Behaviors    I reviewed Nutrition, Behavior, and Exercise changes to start working on. Some of the eating behaviors that we discussed included:  timing of meals, meal composition, the importance of adequate fluids and protein, and selecting appropriate foods for meals and snacks. We also talked about avoiding liquid calories. Patient is encouraged to aim for 64 ounces of sugar-free, caffeine-free, and carbonation-free fluid per day, preferably from water, but also sugar-free beverages such as Crystal Light. Additionally, we discussed healthier options for dining out. Patient was encouraged to always request sauces and dressings on the side and request a salad, broth-based soups, or non-starchy vegetables in place of fries.     Patient's current diet habits include: states gave up red meat 3 days ago; cut portions back, needs to improve more; late night snacking has ceased, dining out less; eliminated ramen noodles  6:00-7:00 AM - UP  8:30 AM - BREAKFAST: apple cinnamon oatmeal w/ 1 tsp sugar OR egg/cheese croissant ([employer] - 2x/wk) OR cereal (Frosted flakes) w/ whole milk  12-1 PM - LUNCH: sandwich (whole wheat bread) w/ ham, cheese, light jones, baked chips, diet soda  8-8:30 PM - DINNER: pot roast, carrots, potatoes (weekend) OR vegetarian omelet, apple cinnamon oatmeal OR chicken tacos (2-6\") w/ salsa, lettuce, cheese  9:30-10:00 AM - BED    DESSERT: rarely - milkshake (Cook[employer]) OR Oreos (2)  SNACKS: Chex mix; fat-free plain [place] yogurt  FLUIDS: diet root beer/sunkist (24 oz/d), water (50+ oz/d)    Protein supplement intake: not sure of brand, tried a chocolate protein shake      Physical Activity/Exercise    Comments: We talked about exercise. Patient was given reasons of why exercise is so important and how that can help with their long-term success. Discussed goal of 150 minutes of vigorous activity/wk and to include purposeful activity, such as parking far in the grocery store lot, etc. I have encouraged patient to get a support system to help with the activity. Currently for activity, patient is: playing basketball at [facility] - 20 min 1x/wk; walking or shooting hoops - 2x/wk 1hr, HIIT 3-4x/wk x10 min      Behavior Modification       Comments: We also talked about behavior modifications. We talked about eating triggers, such as eating in front of the TV and solutions, such as making the TV a no eating zone. If patient is eating out of emotion, food will only temporarily solve that. Patient is encouraged to HALT and assess if they are eating because they are Hungry, or out of emotions: Anxious, Lonely, Tired, which the food will only temporarily solve. We also talked about ways to prevent relapse. Patient's progress towards goals set last month:  1. Aim for 150 min/wk vigorous physical activity by the next appt. Pt states he has met this goal.  2. Limit carbohydrate intake, including sweets, Hi-C orange drink, bread, rice, noodles, cereal, starchy vegetables, and fruits  Progressing, will continue this goal for next month. 3. Pt will weigh/measure food portions, following portion size guidelines as discussed during appt today, and record in a food journal or meal tracking jared and bring to next appt for review. Progressing, will continue this goal for next month;  Pt states measuring at dinner because \"portions can get out of control  but at lunch, when it's something small, I just eat it\". Pt also reports he is not keeping a food journal. States he will \"knock the dust off the [employer] jared\". 4. Eat first meal within 2 hrs of waking and consume a protein-rich, low-fat, low-carb meal/snack every 3-4 hrs while awake, up to 2 hrs before bedtime. Progressing, patient is eating within 2 hrs of waking but has 7-8 hr gap between lunch and dinner that he needs to fit in a protein-rich snack. 5. Pt will find 2 protein supplements from approved list that he will drink post-operatively, and use as a meal replacement or high protein snack up to 2x/d to facilitate pre-op weight loss. Pt progressing, has purchased 1 supplement but unsure if meets our criteria (bought at [facility]); will continue for next month. Goals that patient wants to work on include:  1. Continue 150 min/wk of moderate to vigorous physical activity  2. Pt will continue to decrease food sources of carbohydrates (i.e.cereals/breads/rice/pasta/chips/Chex mix) with particular focus on removing simple sugars (i.e. sweetened cereals) from diet by next appt  3. Pt will weigh/measure food portions, following portion size guidelines as discussed during appt today, and record in a food journal or meal tracking jared and bring to next appt for review. 4. Pt will continue eating his first protein-rich, low-fat, low-carbohyrdate meal within 2 hrs of waking, and he will eat every 3-4 hrs while awake, up to 2 hrs before bedtime, incorporating a protein-rich snack or approved protein shake as a snack between lunch & dinner by next appt. 5. Pt will find 2 protein supplements from approved list that he will drink post-operatively, and use as a meal replacement or high protein snack up to 2x/d to facilitate pre-op weight loss. Handouts Provided:     [] Bariatric Surgery Criteria Packet  [] \"Nut for Nuts? Be careful! \"  [] Meal Guide Handout  [] Carb Counting  [] Protein content of Foods  [] Snack Suggestions  [] BD Starches Packet  [] Protein Chart  [] Post-op Education Packet  [] Overview of Vitamins & Minerals TIMELINE  [] Other  [] None    Follow-up: Pt scheduled for nutrition education on 4/27/22. Pt was given RD contact information for nutrition-related questions. All current questions answered.     Obinna Hernandez, MANDI  3/23/2022

## 2022-03-30 ENCOUNTER — APPOINTMENT (OUTPATIENT)
Dept: GENERAL RADIOLOGY | Age: 32
End: 2022-03-30
Attending: SPECIALIST
Payer: MEDICAID

## 2022-03-30 ENCOUNTER — APPOINTMENT (OUTPATIENT)
Dept: SURGERY | Age: 32
End: 2022-03-30

## 2022-03-30 ENCOUNTER — HOSPITAL ENCOUNTER (OUTPATIENT)
Age: 32
Setting detail: OUTPATIENT SURGERY
Discharge: HOME | End: 2022-03-30
Attending: SPECIALIST | Admitting: SPECIALIST
Payer: MEDICAID

## 2022-03-30 VITALS
SYSTOLIC BLOOD PRESSURE: 151 MMHG | HEIGHT: 72 IN | DIASTOLIC BLOOD PRESSURE: 94 MMHG | WEIGHT: 315 LBS | RESPIRATION RATE: 22 BRPM | BODY MASS INDEX: 42.66 KG/M2 | TEMPERATURE: 97.8 F | HEART RATE: 88 BPM | OXYGEN SATURATION: 96 %

## 2022-03-30 DIAGNOSIS — K21.9 GASTROESOPHAGEAL REFLUX DISEASE WITHOUT ESOPHAGITIS: ICD-10-CM

## 2022-03-30 DIAGNOSIS — E66.01 MORBID OBESITY (HCC): ICD-10-CM

## 2022-03-30 PROCEDURE — 74240 X-RAY XM UPR GI TRC 1CNTRST: CPT | Performed by: SPECIALIST

## 2022-03-30 PROCEDURE — 74240 X-RAY XM UPR GI TRC 1CNTRST: CPT

## 2022-03-30 PROCEDURE — 76040000019: Performed by: SPECIALIST

## 2022-03-30 PROCEDURE — 74011000250 HC RX REV CODE- 250: Performed by: SPECIALIST

## 2022-03-30 NOTE — PROCEDURES
Dianne Alicia   : 1990  Medical Record [id number]            PREPROCEDURE DIAGNOSIS: This patient is preoperative for laparoscopic sleeve gastrectomy procedure with a history of  reflux disease. POSTPROCEDURE DIAGNOSIS: This patient is preoperative for laparoscopic sleeve gastrectomy procedure with a history of  reflux disease. PROCEDURES PERFORMED: Upper GI study with barium. ESTIMATED BLOOD LOSS: None. SPECIMENS: None. STATEMENT OF MEDICAL NECESSITY: The patient is a patient with a  longstanding history of obesity. They are now considering the laparoscopic sleeve gastrectomy procedure as a means of surgical weight control and due to their history of reflux disease and are being assessed preoperatively for such. DESCRIPTION OF PROCEDURE: The patient was brought to the fluoroscopy unit and  was given thin barium. On swallowing of barium, they were noted to have  normal peristalsis of their esophagus. They had prompt filling of distal  esophagus with tapering into the gastroesophageal junction. There was no evidence of a hiatal hernia present. Contrast then filled the gastric cardia, fundus,body and pre pyloric region with no abnormalities noted. Contrast then exited the pylorus in normal fashion. No obstruction was noted. There was no evidence of reflux noted.     (normal anatomy)    Vipul Bob MD

## 2022-04-25 ENCOUNTER — OFFICE VISIT (OUTPATIENT)
Dept: SURGERY | Age: 32
End: 2022-04-25
Payer: MEDICAID

## 2022-04-25 VITALS
BODY MASS INDEX: 42.66 KG/M2 | TEMPERATURE: 96.9 F | HEART RATE: 107 BPM | OXYGEN SATURATION: 96 % | DIASTOLIC BLOOD PRESSURE: 94 MMHG | SYSTOLIC BLOOD PRESSURE: 152 MMHG | RESPIRATION RATE: 16 BRPM | HEIGHT: 72 IN | WEIGHT: 315 LBS

## 2022-04-25 DIAGNOSIS — E66.01 MORBID OBESITY (HCC): ICD-10-CM

## 2022-04-25 DIAGNOSIS — E11.9 TYPE 2 DIABETES MELLITUS WITHOUT COMPLICATION, WITHOUT LONG-TERM CURRENT USE OF INSULIN (HCC): Primary | ICD-10-CM

## 2022-04-25 DIAGNOSIS — I10 PRIMARY HYPERTENSION: ICD-10-CM

## 2022-04-25 DIAGNOSIS — E66.01 MORBID OBESITY WITH BODY MASS INDEX (BMI) OF 60.0 TO 69.9 IN ADULT (HCC): ICD-10-CM

## 2022-04-25 DIAGNOSIS — G47.30 SLEEP APNEA, UNSPECIFIED TYPE: ICD-10-CM

## 2022-04-25 PROCEDURE — 99214 OFFICE O/P EST MOD 30 MIN: CPT | Performed by: SPECIALIST

## 2022-04-25 NOTE — PATIENT INSTRUCTIONS

## 2022-04-25 NOTE — PROGRESS NOTES
Pre-Operative Progress Consultation  Original consult early Jan with a weight of 462 lbs  Transfer from [facility] office    Subjective:     Belinda Talbert is a 33 y.o. obese male with a Body mass index is 62.29 kg/m². .  he desires surgery at this time because of health issues and quality of life issues. Belinda Talbert has tried multiple diets in his lifetime most recently tried physician supervised, behavior modification and unsupervised diets. He was given a 25 lb wt loss goal at his Jan consult. As of today he has lost 5 lbs. His UGI was normal     He is going to his PCP's office later today to discuss a second HTN medication. Bariatric comorbidities present are   Patient Active Problem List   Diagnosis Code    Morbid obesity ([address] E66.01    Anxiety F41.9    Chronic back pain M54.9, G89.29    Hypertension I10    Snores R06.83    GERD (gastroesophageal reflux disease) K21.9    Sleep disorder breathing G47.30    Diabetes mellitus ([address] E11.9    Sleep apnea G47.30    Anemia D64.9    Morbid obesity with body mass index (BMI) of 60.0 to 69.9 in adult (HCC) E66.01, Z68.44     The patient desires laparoscopic sleeve gastrectomy for surgical weight loss. Belinda Talbert is here today to check progress with weight loss / evaluate nutritional status and review all subspecialty clearances in hopes of proceeding to the operating room.      Patient Active Problem List    Diagnosis Date Noted    Diabetes mellitus ([address]     Sleep apnea     Anemia     Morbid obesity with body mass index (BMI) of 60.0 to 69.9 in adult ([facility])     Morbid obesity ([facility])     Anxiety     Chronic back pain     Hypertension     Snores     GERD (gastroesophageal reflux disease)     Sleep disorder breathing       Past Surgical History:   Procedure Laterality Date    HX OTHER SURGICAL      resection of benign cyst from neck      Social History     Tobacco Use    Smoking status: Never Smoker    Smokeless tobacco: Never Used   Substance Use Topics    Alcohol use: No      Family History   Problem Relation Age of Onset    Arthritis-rheumatoid Mother       Current Outpatient Medications   Medication Sig Dispense Refill    ibuprofen (MOTRIN) 600 mg tablet       amLODIPine (NORVASC) 10 mg tablet Take 10 mg by mouth daily.  metFORMIN (GLUCOPHAGE) 500 mg tablet Take 500 mg by mouth two (2) times daily (with meals).        Allergies   Allergen Reactions    Penicillins Other (comments)     Since he was a kid          Review of Systems:        General - No history or complaints of unexpected fever, chills, or weight loss  Head/Neck - No history or complaints of headache, diplopia, dysphagia, hearing loss  Cardiac - No history or complaints of chest pain, palpitations, murmur, or shortness of breath  Pulmonary - No history or complaints of shortness of breath, productive cough, hemoptysis  Gastrointestinal - No history or complaints of reflux,  abdominal pain, obstipation/constipation, blood per rectum  Genitourinary - No history or complaints of hematuria/dysuria, stress urinary incontinence symptoms, or renal lithiasis  Musculoskeletal - No history or complaints of joint pain or muscular weakness  Hematologic - No history or complaints of bleeding disorders, blood transfusions, sickle cell anemia  Neurologic - No history or complaints of  migraine headaches, seizure activity, syncopal episodes, TIA or stroke  Integumentary - No history or complaints of rashes, abnormal nevi, skin cancer    Objective:     Visit Vitals  BP (!) 152/94   Pulse (!) 107   Temp 96.9 °F (36.1 °C)   Resp 16   Ht 6' (1.829 m)   Wt (!) 208.3 kg (459 lb 4.8 oz)   SpO2 96%   BMI 62.29 kg/m²     Physical Examination: General appearance - alert, well appearing, and in no distress  Mental status - alert, oriented to person, place, and time  Eyes - pupils equal and reactive, extraocular eye movements intact  Nose - normal and patent, no erythema, discharge or polyps  Mouth - mucous membranes moist, pharynx normal without lesions  Neck - supple, no significant adenopathy  Lymphatics - no palpable lymphadenopathy, no hepatosplenomegaly  Chest - clear to auscultation, no wheezes, rales or rhonchi, symmetric air entry  Heart - normal rate, regular rhythm, normal S1, S2, no murmurs, rubs, clicks or gallops  Abdomen - soft, nontender, nondistended, no masses or organomegaly  Back exam - full range of motion, no tenderness, palpable spasm or pain on motion  Neurological - alert, oriented, normal speech, no focal findings or movement disorder noted  Musculoskeletal - no joint tenderness, deformity or swelling  Extremities - peripheral pulses normal, no pedal edema, no clubbing or cyanosis  Skin - normal coloration and turgor, no rashes, no suspicious skin lesions noted    Labs:             Assessment:     Morbid obesity with associated comorbidity     Plan:     Continuation of Pre-Operative evaluation / clearance. Randi Ross has returned to the office today to discuss his status as a surgical candidate. his progress has been noted and reviewed. We will continue the pre-operative process and work towards goals as outlined. he has 22 more pounds to lose before proceeding to the OR. (3 pounds lost since initial consult visit 3.5 months ago)  he has NA more nutritional visits to complete before proceeding to the OR  he has an outstanding NA clearance to review before proceeding to the OR. Randi Ross understand the rationales for all the above. It has been discussed that given his obese condition that the best surgical option for this patient would be the laparoscopic sleeve gastrectomy. Randi Ross agrees with the surgical choice and has been educated in it's; risks, benefits, and alternatives. We will continue with the pre-operative evaluation as needed to check progress. The patient understands the plan of action    He has completed all criteria.   The plan going forward as it relates to his weight loss will be to loose another 20 pounds and proceed with surgery in July. Secondary Diagnoses:     Dietary Intervention  - The patient is currently scheduled to see or has been followed by a bariatric nutritionist for an attempt at preoperative weight loss as has been dictated by their insurance carrier. They will be assessed at various times during their follow up to evaluate their progress depending on the length of time that is required once again by their carrier. I have explained the importance of preoperative weight loss and the benefits regarding lower surgical risk and also assisting the patient in reaching their weight loss goal.  Finally they understand their is a physiologic benefit from the standpoint of hepatic volume reduction preoperatively. I have reiterated the importance of a low carbohydrate and high protein regimen to achieve their stated goal.    Obstructive Sleep Apnea -The patient understands the association of sleep apnea and obesity and the additional risk that it caries related to post surgical complications. We will have the patient bring their CPAP machine to the hospital for use both postoperatively in the PACU and on the floor at its appropriate setting.  We will have them continue using it while at home after surgery and follow up with their pulmonologist 6 months after to be retested to see if it can be discontinued at that time period. Adult Onset Diabetes - The patient has prasanna given a very low carbohydrate diet preoperatively along with instructions to monitor their blood sugars on a regular daily basis.  When  their surgery is performed  we will be monitoring the patient with sliding scale insulin and accuchecks.  Based on those values we will determine whether the patient needs a reduction of those medications postoperatively or total removal of those medications on discharge.  We will have the patient continue accuchecks postoperatively while at home also and report to me or their family physician for appropriate adjustments as needed.  The patient also understands that in the event of uncontrolled blood sugar preoperatively that we may choose to postpone their surgery. Hypertension - The patient has a clear understanding of how weight loss improves hypertension as a whole, but also they understand that there is a significant genetic component to this disease process. We will monitor the patients blood pressure while in the hospital and the plan would be to continue those medications postoperatively.  If a diuretic is being used we will stop them on discharge to prevent dehydration particularly with the sleeve gastrectomy and the gastric bypass procedures.  They will be instructed to monitor their blood pressure postoperatively while at home and notify their primary care physician in the event of any significantly high or uncharacteristic readings.       Signed By: Mali Rock MD     April 25, 2022

## 2022-06-03 DIAGNOSIS — E66.01 MORBID OBESITY (HCC): Primary | ICD-10-CM

## 2022-06-03 DIAGNOSIS — E66.01 MORBID OBESITY WITH BODY MASS INDEX (BMI) OF 60.0 TO 69.9 IN ADULT (HCC): ICD-10-CM

## 2022-06-03 DIAGNOSIS — Z01.812 BLOOD TESTS PRIOR TO TREATMENT OR PROCEDURE: ICD-10-CM

## 2022-06-03 DIAGNOSIS — E11.9 TYPE 2 DIABETES MELLITUS WITHOUT COMPLICATION, WITHOUT LONG-TERM CURRENT USE OF INSULIN (HCC): ICD-10-CM

## 2022-06-03 DIAGNOSIS — G47.30 SLEEP APNEA, UNSPECIFIED TYPE: ICD-10-CM

## 2022-06-03 DIAGNOSIS — I10 PRIMARY HYPERTENSION: ICD-10-CM

## 2022-06-15 ENCOUNTER — HOSPITAL ENCOUNTER (OUTPATIENT)
Dept: PREADMISSION TESTING | Age: 32
Discharge: HOME | End: 2022-06-15

## 2022-06-15 VITALS — WEIGHT: 315 LBS | HEIGHT: 72 IN | BODY MASS INDEX: 42.66 KG/M2

## 2022-06-15 RX ORDER — FAMOTIDINE 20 MG/50ML
20 INJECTION, SOLUTION INTRAVENOUS ONCE
Status: CANCELLED | OUTPATIENT
Start: 2022-06-15 | End: 2022-06-15

## 2022-06-15 RX ORDER — CIPROFLOXACIN 2 MG/ML
400 INJECTION, SOLUTION INTRAVENOUS ONCE
Status: CANCELLED | OUTPATIENT
Start: 2022-06-15 | End: 2022-06-15

## 2022-06-15 RX ORDER — ENOXAPARIN SODIUM 100 MG/ML
40 INJECTION SUBCUTANEOUS ONCE
Status: CANCELLED | OUTPATIENT
Start: 2022-06-15 | End: 2022-06-15

## 2022-06-15 RX ORDER — SODIUM CHLORIDE, SODIUM LACTATE, POTASSIUM CHLORIDE, CALCIUM CHLORIDE 600; 310; 30; 20 MG/100ML; MG/100ML; MG/100ML; MG/100ML
125 INJECTION, SOLUTION INTRAVENOUS CONTINUOUS
Status: CANCELLED | OUTPATIENT
Start: 2022-06-15

## 2022-06-15 RX ORDER — NYSTATIN 100000 [USP'U]/ML
500000 SUSPENSION ORAL
Status: CANCELLED | OUTPATIENT
Start: 2022-06-15 | End: 2022-06-15

## 2022-06-15 RX ORDER — LOSARTAN POTASSIUM 50 MG/1
TABLET ORAL DAILY
COMMUNITY
End: 2022-09-08

## 2022-06-15 RX ORDER — ACETAMINOPHEN 500 MG
1000 TABLET ORAL ONCE
Status: CANCELLED | OUTPATIENT
Start: 2022-06-15 | End: 2022-06-16

## 2022-06-15 NOTE — PERIOP NOTES
PAT phone interview completed. Patient made aware to be NPO. Patient stated he had two doses of COVID vaccine. Patient made aware not to get COVID test and to bring proof of COVID vaccination on DOS. Patient made aware not to wear jewelry, lotion, oil or spray on DOS. Patient stated he has a ride for discharge. Patient stated he will come 6/27 for labs, EKG, and appointment with Dr. Melvina Caal, Patient stated he does not have a DNR order.

## 2022-06-20 ENCOUNTER — TELEPHONE (OUTPATIENT)
Dept: SURGERY | Age: 32
End: 2022-06-20

## 2022-06-20 NOTE — TELEPHONE ENCOUNTER
A telephone call was placed to patient to assess habits 30 days prior to having surgery. Diabetes:  Have your blood sugar levels been well controlled and significantly under 200 the entire month prior to surgery? If not, you need to let us know and see your family physician for management. Patient stated he has had normal range of blood sugar levels     Hypertension:  Have your blood pressures been well-controlled with a systolic pressure lower than 102 and a diastolic pressure lower than 100? Patient aware and said their blood pressure is well controlled with their medications. Steroids (oral or injections of any kind):  Have you taken any steroid injections in your back, knee, foot, or any part of your body? Have you taken steroids for any type of respiratory virus, asthma, or COPD? Patient is aware and denied steroid use. Non-steroidal Anti-inflammatory medications:  Have you taken Motrin, Aleve, Naproxen, Aspirin, Celebrex, or any other over-the-counter or prescription NSAID? Patient is aware and denied NSAID use. Recreational Drug Use (tobacco, vaping with tobacco, marijuana/edibles, cocaine, or any other recreational drugs):  Have you had any nicotine products to include cigarettes, vaping, nicotine patches, and/or nicotine gum? Have you smoked marijuana and/or ate edibles containing marijuana? Have you consumed any cocaine and/or any other recreational drugs? Patient is aware and denied any recreational drug use. Medication:  Have you had any significant changes to any medication? Patient is aware and denied changes to any medications.

## 2022-06-27 ENCOUNTER — OFFICE VISIT (OUTPATIENT)
Dept: SURGERY | Age: 32
End: 2022-06-27
Payer: MEDICAID

## 2022-06-27 ENCOUNTER — HOSPITAL ENCOUNTER (OUTPATIENT)
Dept: LAB | Age: 32
Discharge: HOME | End: 2022-06-27

## 2022-06-27 ENCOUNTER — HOSPITAL ENCOUNTER (OUTPATIENT)
Dept: PREADMISSION TESTING | Age: 32
Discharge: HOME | End: 2022-06-27
Payer: MEDICAID

## 2022-06-27 ENCOUNTER — NURSE NAVIGATOR (OUTPATIENT)
Dept: SURGERY | Age: 32
End: 2022-06-27

## 2022-06-27 ENCOUNTER — HOSPITAL ENCOUNTER (OUTPATIENT)
Dept: BARIATRICS/WEIGHT MGMT | Age: 32
Discharge: HOME | End: 2022-06-27

## 2022-06-27 VITALS
WEIGHT: 315 LBS | SYSTOLIC BLOOD PRESSURE: 145 MMHG | OXYGEN SATURATION: 96 % | TEMPERATURE: 97.1 F | HEIGHT: 72 IN | HEART RATE: 98 BPM | BODY MASS INDEX: 42.66 KG/M2 | DIASTOLIC BLOOD PRESSURE: 86 MMHG

## 2022-06-27 DIAGNOSIS — E66.01 MORBID OBESITY (HCC): Primary | ICD-10-CM

## 2022-06-27 DIAGNOSIS — G47.30 SLEEP APNEA, UNSPECIFIED TYPE: ICD-10-CM

## 2022-06-27 DIAGNOSIS — E66.01 MORBID OBESITY (HCC): ICD-10-CM

## 2022-06-27 DIAGNOSIS — E11.9 TYPE 2 DIABETES MELLITUS WITHOUT COMPLICATION, WITHOUT LONG-TERM CURRENT USE OF INSULIN (HCC): ICD-10-CM

## 2022-06-27 DIAGNOSIS — I10 PRIMARY HYPERTENSION: ICD-10-CM

## 2022-06-27 DIAGNOSIS — E66.01 MORBID OBESITY WITH BODY MASS INDEX (BMI) OF 60.0 TO 69.9 IN ADULT (HCC): ICD-10-CM

## 2022-06-27 DIAGNOSIS — K21.9 GASTROESOPHAGEAL REFLUX DISEASE WITHOUT ESOPHAGITIS: ICD-10-CM

## 2022-06-27 DIAGNOSIS — G89.18 POST-OP PAIN: ICD-10-CM

## 2022-06-27 DIAGNOSIS — D64.9 ANEMIA, UNSPECIFIED TYPE: ICD-10-CM

## 2022-06-27 DIAGNOSIS — G89.29 CHRONIC LOW BACK PAIN, UNSPECIFIED BACK PAIN LATERALITY, UNSPECIFIED WHETHER SCIATICA PRESENT: ICD-10-CM

## 2022-06-27 DIAGNOSIS — M54.50 CHRONIC LOW BACK PAIN, UNSPECIFIED BACK PAIN LATERALITY, UNSPECIFIED WHETHER SCIATICA PRESENT: ICD-10-CM

## 2022-06-27 DIAGNOSIS — Z01.812 BLOOD TESTS PRIOR TO TREATMENT OR PROCEDURE: ICD-10-CM

## 2022-06-27 LAB
ABO + RH BLD: NORMAL
ATRIAL RATE: 87 BPM
BLOOD GROUP ANTIBODIES SERPL: NORMAL
CALCULATED P AXIS, ECG09: 49 DEGREES
CALCULATED R AXIS, ECG10: 20 DEGREES
CALCULATED T AXIS, ECG11: 36 DEGREES
DIAGNOSIS, 93000: NORMAL
P-R INTERVAL, ECG05: 188 MS
Q-T INTERVAL, ECG07: 358 MS
QRS DURATION, ECG06: 88 MS
QTC CALCULATION (BEZET), ECG08: 430 MS
SPECIMEN EXP DATE BLD: NORMAL
VENTRICULAR RATE, ECG03: 87 BPM
XX-LABCORP SPECIMEN COL,LCBCF: NORMAL

## 2022-06-27 PROCEDURE — 99001 SPECIMEN HANDLING PT-LAB: CPT

## 2022-06-27 PROCEDURE — 99215 OFFICE O/P EST HI 40 MIN: CPT | Performed by: SPECIALIST

## 2022-06-27 PROCEDURE — 93005 ELECTROCARDIOGRAM TRACING: CPT

## 2022-06-27 PROCEDURE — 86900 BLOOD TYPING SEROLOGIC ABO: CPT

## 2022-06-27 RX ORDER — ENOXAPARIN SODIUM 100 MG/ML
40 INJECTION SUBCUTANEOUS EVERY 12 HOURS
Qty: 28 EACH | Refills: 0 | Status: SHIPPED | OUTPATIENT
Start: 2022-06-27 | End: 2022-09-05

## 2022-06-27 RX ORDER — OXYCODONE AND ACETAMINOPHEN 5; 325 MG/1; MG/1
1 TABLET ORAL
Qty: 30 TABLET | Refills: 0 | Status: SHIPPED | OUTPATIENT
Start: 2022-06-27 | End: 2022-07-27

## 2022-06-27 RX ORDER — ONDANSETRON 8 MG/1
8 TABLET, ORALLY DISINTEGRATING ORAL
Qty: 30 TABLET | Refills: 0 | Status: SHIPPED | OUTPATIENT
Start: 2022-06-27 | End: 2022-09-05

## 2022-06-27 NOTE — PROGRESS NOTES
CLINICAL NUTRITION PRE-OPERATIVE EDUCATION    Patient's Name: Juan M Meredith   Age: 33 y.o. YOB: 1990   Sex: male    Education & Materials Provided: Post-op Binder to include:   Liquid Diet Shopping List    Supplemental Resource Guide: MVI, B12, Calcium Citrate, Vitamin D, Vitamin B50, and iron recommendations   Protein Supplement Information    Fluid Requirements/ No Straws   No Caffeine or Carbonation    No alcohol               No Snacks or No Concentrated Sweets      Exercising    Key Diet Principles             Addressed Current Habits/Changes to Make    Patient was instructed on clear liquid diet to follow for one (1) day prior to surgery.  Patient has been educated on the liquid diet to begin day 2 post-op and continue for 2 weeks post surgery.  Patient understands the transition of the diet and need to remain on full liquid diet until advanced by provider and dietitian. Summary:  Patient has completed the required amount of visits with the Registered Dietitian. During these nutrition visits, we focused on dietary changes, behavior changes, and the importance of establishing an exercise routine. The diet protocol that patient was prescribed emphasized on low carbohydrates (less than 50 grams per day prior to surgery), and 60-80 grams of protein per day. At today's session, patient was educated on the post-op diet and vitamin/mineral protocols. Patient understands the importance of keeping total fat and sugar intake to less than 3g per serving. Patient is aware of the the timeline for the different stages of the post-op diet and is aware that they will be on a modified full liquid diet for 2 weeks post-op. Patient understands that the body needs ~60-70 g/d protein. During the liquid diet phase, patient will need a minimum of 60 g/d protein from supplemental shakes.   Once eating soft protein-rich foods, patient will need 40-60 g/d protein from supplemental shakes to meet the total daily intake goal of  g/d protein. Vitamin supplementation was reviewed, and pt was encouraged to continue with BID children's chewable complete multivitamin with iron and every day probiotic supplementation until their 1 month call with RD, when they will add in the additional recommended vitamin & mineral supplements. Patient understands the importance of being compliant with the diet and vitamin/mineral protocols, as well as the complications and risks that can occur if they are non-compliant. Patient has also been educated on the pre-op clear liquid diet protocol for 1 day prior to surgery. Patient understands that failure to comply with following the pre-op clear liquid diet could result in surgery being canceled. Patient's 2 week post-op nutrition virtual appointment has been scheduled. If patient is tolerating liquid diet without difficulty, RD will recommend advancement of patient's diet to soft/moist (puree) diet to provider. Patient will also have a virtual appointment with RD again at 1 month post-op to assess tolerance of soft/moist (puree) diet and advance to soft protein with soft vegetables, if soft/moist (puree) diet is tolerated without difficulty. RD will assess patient's compliance with current protocol, including diet, vitamins/minerals, protein shakes, and physical activity. Post-op diet guidelines will be reinforced. Patient provided with RD contact information, and RD is available for questions and to meet with patient outside of the 2 week and 1 month post-op visit prn. RD contact information in pre-op binder was reviewed in class. All current patient stated questions answered.     Lizbeth Patel MS, RD/LD  6/27/2022

## 2022-06-27 NOTE — PROGRESS NOTES
Sleeve Gastrectomy - History and Physical    Subjective: The patient is a 33 y.o. obese male with a Body mass index is 61.41 kg/m². Audrey Balderas he presents now to review their work up to date to see if they are a candidate for surgery and whether or not to proceed with the previously requested procedure. Bariatric comorbidities continue to include:   Patient Active Problem List   Diagnosis Code    Morbid obesity ([facility] [address] E66.01    Anxiety F41.9    Chronic back pain M54.9, G89.29    Hypertension I10    Snores R06.83    GERD (gastroesophageal reflux disease) K21.9    Sleep disorder breathing G47.30    Diabetes mellitus ([facility] [address] E11.9    Sleep apnea G47.30    Anemia D64.9    Morbid obesity with body mass index (BMI) of 60.0 to 69.9 in adult ([facility] [address] E66.01, Z68.44       They have been generally well prior to this visit and have had no recent significant illnesses. The patient has had no gastrointestinal issues that would preclude them from proceeding with the surgery they have chosen. Davon Lazcano has recently tried a preoperative weight loss program  in addition to seeing a bariatric nutritionist preoperatively. We have discussed on at least one other occasion about the various types of surgical weight loss procedures and they have considered these options after our initial consultation. We have once again discussed these procedures in detail and they have now decided on a surgical procedure. They present today to discuss this and confirm that their evaluation pre operatively is acceptable to continue with surgery. The patient desires laparoscopic sleeve gastrectomy for surgical weight loss. The patients goal weight is 250lb. These goals are consistent with expected outcomes of their desired operation. his Medical goals are resolution of these health issues.     Patient Active Problem List    Diagnosis Date Noted    Diabetes mellitus ([facility] [address]     Sleep apnea     Anemia     Morbid obesity with body mass index (BMI) of 60.0 to 69.9 in adult [facility])     Morbid obesity (HCC)     Anxiety     Chronic back pain     Hypertension     Snores     GERD (gastroesophageal reflux disease)     Sleep disorder breathing      Past Surgical History:   Procedure Laterality Date    HX OTHER SURGICAL      resection of benign cyst from neck      Social History     Tobacco Use    Smoking status: Never Smoker    Smokeless tobacco: Never Used   Substance Use Topics    Alcohol use: No      Family History   Problem Relation Age of Onset    Arthritis-rheumatoid Mother       Current Outpatient Medications   Medication Sig Dispense Refill    losartan (COZAAR) 50 mg tablet Take  by mouth daily.  ibuprofen (MOTRIN) 600 mg tablet       amLODIPine (NORVASC) 10 mg tablet Take 10 mg by mouth daily.  metFORMIN (GLUCOPHAGE) 500 mg tablet Take 500 mg by mouth two (2) times daily (with meals).  enoxaparin (LOVENOX) 40 mg/0.4 mL 0.4 mL by SubCUTAneous route every twelve (12) hours for 14 days. Indications: deep vein thrombosis prevention (Patient not taking: Reported on 6/27/2022) 28 Each 0    ondansetron (ZOFRAN ODT) 8 mg disintegrating tablet Take 1 Tablet by mouth every eight (8) hours as needed for Nausea or Vomiting.  (Patient not taking: Reported on 6/27/2022) 30 Tablet 0     Allergies   Allergen Reactions    Penicillins Other (comments)     Since he was a kid          Review of Systems:     General - No history or complaints of unexpected fever, chills, or weight loss  Head/Neck - No history or complaints of headache, diplopia, dysphagia, hearing loss  Cardiac - No history or complaints of chest pain, palpitations, murmur, or shortness of breath  Pulmonary - No history or complaints of shortness of breath, productive cough, hemoptysis  Gastrointestinal - mild reflux,no  abdominal pain, obstipation/constipation or blood per rectum  Genitourinary - No history or complaints of hematuria/dysuria, stress urinary incontinence symptoms, or renal lithiasis  Musculoskeletal - mild joint pain,  no muscular weakness  Hematologic - No history or complaints of bleeding disorders,  No blood transfusions  Neurologic - No history or complaints of  migraine headaches, seizure activity, syncopal episodes, TIA or stroke  Integumentary - No history or complaints of rashes, abnormal nevi, skin cancer  Gynecological - n/a               Objective:     Visit Vitals  BP (!) 145/86   Pulse 98   Temp 97.1 °F (36.2 °C)   Ht 6' (1.829 m)   Wt (!) 205.4 kg (452 lb 12.8 oz)   SpO2 96%   BMI 61.41 kg/m²       Physical Examination: General appearance - alert, well appearing, and in no distress and oriented to person, place, and time  Mental status - alert, oriented to person, place, and time, normal mood, behavior, speech, dress, motor activity, and thought processes  Eyes - pupils equal and reactive, extraocular eye movements intact, sclera anicteric, left eye normal, right eye normal  Ears - right ear normal, left ear normal  Nose - normal and patent, no erythema, discharge or polyps  Mouth - mucous membranes moist, pharynx normal without lesions  Neck - supple, no significant adenopathy  Lymphatics - no palpable lymphadenopathy, no hepatosplenomegaly  Chest - clear to auscultation, no wheezes, rales or rhonchi, symmetric air entry  Heart - normal rate, regular rhythm, normal S1, S2, no murmurs, rubs, clicks or gallops  Abdomen - soft, nontender, nondistended, no masses or organomegaly, moderate central obesity  Back exam - full range of motion, no tenderness, palpable spasm or pain on motion  Neurological - alert, oriented, normal speech, no focal findings or movement disorder noted  Musculoskeletal - no joint tenderness, deformity or swelling  Extremities - peripheral pulses normal, no pedal edema, no clubbing or cyanosis  Skin - normal coloration and turgor, no rashes, no suspicious skin lesions noted    Labs :     Lab Results   Component Value Date/Time WBC 4.9 2022 11:13 AM    HGB 13.4 2022 11:13 AM    HCT 44.4 2022 11:13 AM    PLATELET 832  11:13 AM    MCV 88.1 2022 11:13 AM     Lab Results   Component Value Date/Time    Sodium 142 2022 11:13 AM    Potassium 4.0 2022 11:13 AM    Chloride 108 2022 11:13 AM    CO2 31 2022 11:13 AM    Anion gap 3 2022 11:13 AM    Glucose 97 2022 11:13 AM    BUN 11 2022 11:13 AM    Creatinine 0.97 2022 11:13 AM    BUN/Creatinine ratio 11 (L) 2022 11:13 AM    GFR est AA >60 2022 11:13 AM    GFR est non-AA >60 2022 11:13 AM    Calcium 8.9 2022 11:13 AM    Bilirubin, total 0.2 2022 11:13 AM    Alk. phosphatase 57 2022 11:13 AM    Protein, total 7.6 2022 11:13 AM    Albumin 3.2 (L) 2022 11:13 AM    Globulin 4.4 (H) 2022 11:13 AM    A-G Ratio 0.7 (L) 2022 11:13 AM    ALT (SGPT) 27 2022 11:13 AM     No results found for: IRON, FE, TIBC, IBCT, PSAT, FERR  No results found for: FOL, RBCF  No results found for: Shanelle Pearson, [id number]            Cardiac / Pulmonary Evaluation:           UGI Results:     ZA Mendoza   Physician Assistant   Physician Assistant   Procedures      Signed   Date of Service:  22 1324                         []Hide copied text    []Pascual for details    Judith Mead      : 1990  Medical Record [facility]:[id number]                 PREPROCEDURE DIAGNOSIS: This patient is preoperative for laparoscopic sleeve gastrectomy procedure with a history of  reflux disease.     POSTPROCEDURE DIAGNOSIS: This patient is preoperative for laparoscopic sleeve gastrectomy procedure with a history of  reflux disease.        PROCEDURES PERFORMED: Upper GI study with barium.     ESTIMATED BLOOD LOSS: None.     SPECIMENS: None.     STATEMENT OF MEDICAL NECESSITY: The patient is a patient with a  longstanding history of obesity.  They are now considering the laparoscopic sleeve gastrectomy procedure as a means of surgical weight control and due to their history of reflux disease and are being assessed preoperatively for such.     DESCRIPTION OF PROCEDURE: The patient was brought to the fluoroscopy unit and  was given thin barium. On swallowing of barium, they were noted to have  normal peristalsis of their esophagus. They had prompt filling of distal  esophagus with tapering into the gastroesophageal junction. There was no evidence of a hiatal hernia present. Contrast then filled the gastric cardia, fundus,body and pre pyloric region with no abnormalities noted. Contrast then exited the pylorus in normal fashion. No obstruction was noted. There was no evidence of reflux noted.     (normal anatomy)     Bren Chawla MD      Cosigned by: Allie Alexandra MD at 03/31/22 1199           Assessment:     Morbid obesity with comorbidity    Plan:     laparoscopic sleeve gastrectomy    This is a 33 y.o. male with a BMI of Body mass index is 61.41 kg/m². and the weight-related co-morbidties as noted above. Anton Meek meets the NIH criteria for bariatric surgery based upon the BMI of Body mass index is 61.41 kg/m². and multiple weight-related co-morbidties. Anton Meek has elected laparoscopic sleeve gastrectomy as his intervention of choice for treatment of morbid obestiy through surgical means secondary to its safety profile, rapid return to work  and decreases in operative risks over gastric bypass. In the office today, following Randall's history and physical examination, a 40-50 minute discussion regarding the anatomic alterations for the laparoscopic sleeve gastrectomy was undertaken. The dietary expectations and the patient  dependent factors for success were thoroughly discussed, to include the need for interval follow-up and long-term dietary changes associated with success.  The possible complications of the sleeve gastrectomy  were also discussed, to include;death, DVT/PE, staple line leak, bleeding, stricture formation, infection, nutritional deficiencies and sleeve dilation. Specific weight related outcomes for success were also discussed with an emphasis on careful and close follow-up with the first year and eating behavior modification as the baseline and cyclical hunger return. The patient expressed an understanding of the above factors, and his questions were answered in their entirety. In addition, the patient attended a 1.5 hour power point seminar regarding obesity, surgical weight loss including, adjustable gastric band, gastric bypass, and sleeve gastrectomy. This discussion contrasted the different surgical techniques, mechanisms of actions and expected outcomes, and surgical and medical risks associated with each procedure. During this seminar, there was a long question and answer session where each questions was answered until there were no additional questions. Today, the patient had all of his questions answered and the decision was made today that the patient's preoperative evaluation is acceptable for them  to proceed with bariatric surgery  choosing the sleeve gastrectomy as his surgical option. Secondary Diagnoses:     Adult Onset Diabetes - the patient understands, and we have discussed in detail, that this is an active acute health problem that has a multifactorial effect on their body from the standpoint of healing and general health. They understand that diabetes creates inflammation within the body that affects other organ processes such as cardiac function and renal function which are integral in the bodies homeostasis. This lack of maintenance of Accu-Cheks within the normal range leads to risk associated with surgical procedures. They understand that in addition to diabetes, once they develop other health issues, their risk is exponentially worse.   Currently they understand that this likely is the single most important item that they need to control in the perioperative process. They understand that the [facility] protocol is that patients entering the operative suite, should have an A1c less than 8.5 prior to surgery. Based on Accu-Chek readings this would mean that there daily Accu-Cheks are in the 180 range or less. Today based on my review of their care everywhere portion of the chart and or notes from their primary care physician, there hemoglobin A1c level is in the range of 6.9. The patient has been given a very low carbohydrate diet preoperatively along with instructions to monitor their blood sugars on a regular daily basis. When  their surgery is performed  we will be monitoring the patient with sliding scale insulin and accuchecks. Based on those values we will determine whether the patient needs a reduction of those medications postoperatively or total removal of those medications on discharge. We will have the patient continue acuchecks postoperatively while at home also and report to me or their family physician for appropriate adjustments as needed. The patient also understands that in the event of uncontrolled blood sugar preoperatively that we may choose to postpone their surgery. DVT / Pulmonary Embolus Risk - The patient is at a higher risk for post operative DVT / pulmonary embolus secondary to their morbid obese status, relative sedentary   lifestyle, and impending general anesthetic. We will plan to use anticoagulation therapy pre and post operative as well as TEDs and  pneumatic compression devices and   encourage ambulation once on the hospital nursing floor. The need for  at home anticoagulation therapy has also been discussed. The patient understands   that their efforts at ambulation are of vital importance to reduce the risk of this complication thus placing significant burden on them as to the prevention of such issues.    Signs and symptoms of DVT / PE have been discussed with the patient and they have been instructed to call the office if any these occur in the \"at home\" post op phase. The patient has been provided with a post operative prescription of Lovenox and instructed in its use for DVT prophylaxis. GERD -The patient understands that weight loss surgery is not a guaranteed cure for reflux disease but does understand the benefits that weight loss can have on reflux disease. They also understand that at the time of surgery the gastroesophageal junction will be evaluated for the presence of a diaphragmatic hernia. Hernias will be corrected always with the surgical procedure if possible and is deemed safe. The patient also understands that neither weight loss surgery nor repair of a diaphragmatic hernia repair guarantees the complete cessation of the disease. They also understand there is a possibility of recurrence of these hernias with a simple crural repair as is performed with these procedures. They understand they may have to continue their medications in the postoperative period. They have a good understanding that the gastric bypass procedure is better suited to total resolution of this issue and that neither the Lap Band or sleeve gastrectomy is considered a curative procedure as it pertains to this diagnosis. Hypertension - the patient understands, and we have discussed in detail, that this is an active acute health problem that has a multifactorial effect on their body from the standpoint of healing. They understand that uncontrolled hypertension affects other organ processes and this leads to risk associated with surgical procedures. They understand that in addition to hypertension, once they develop other health issues, their risk is exponentially worse. Currently they understand that this likely one of the single most important items that they need to control in the perioperative process.   They understand that the hospitals protocol is that patients entering the operative suite should have a blood pressure reading less than 140/90 prior to surgery. Elevated readings today in office above 160/90 are recommended emergent PCP follow-up or if symptomatic to proceed to the ED. The patient has a clear understanding of how weight loss improves hypertension as a whole, but also they understand that there is a significant genetic component to this disease process. We will monitor the patients blood pressure while in the hospital and the plan would be to continue those medications postoperatively. If a diuretic is being used we will stop them on discharge to prevent dehydration particularly with the sleeve gastrectomy and the gastric bypass procedures. They will be instructed to monitor their blood pressure postoperatively while at home and notify their primary care physician in the event of any significantly high or uncharacteristic readings. Obstructive Sleep Apnea -The patient understands the association of sleep apnea and obesity and the additional risk that it caries related to post surgical complications. If they have not been tested for sleep apnea and I feel they are at increased risk for this diagnosis, then they will be scheduled for a consultation with a Pulmonologist for such. In the event that they josef this diagnosis we will have the patient bring their CPAP machine to the hospital for use both postoperatively in the PACU and on the floor at its appropriate setting.  We will have them continue using it while at home after surgery and follow up with their pulmonologist 6 months after to be retested to see if it can be discontinued at that time period.           Signed By: Radha Garcia MD     June 27, 2022

## 2022-06-27 NOTE — H&P (VIEW-ONLY)
Sleeve Gastrectomy - History and Physical    Subjective: The patient is a 33 y.o. obese male with a Body mass index is 61.41 kg/m². Marlys Moraes he presents now to review their work up to date to see if they are a candidate for surgery and whether or not to proceed with the previously requested procedure. Bariatric comorbidities continue to include:   Patient Active Problem List   Diagnosis Code    Morbid obesity ([facility] E66.01    Anxiety F41.9    Chronic back pain M54.9, G89.29    Hypertension I10    Snores R06.83    GERD (gastroesophageal reflux disease) K21.9    Sleep disorder breathing G47.30    Diabetes mellitus ([facility] E11.9    Sleep apnea G47.30    Anemia D64.9    Morbid obesity with body mass index (BMI) of 60.0 to 69.9 in adult ([facility] E66.01, Z68.44       They have been generally well prior to this visit and have had no recent significant illnesses. The patient has had no gastrointestinal issues that would preclude them from proceeding with the surgery they have chosen. Ashley Mancilla has recently tried a preoperative weight loss program  in addition to seeing a bariatric nutritionist preoperatively. We have discussed on at least one other occasion about the various types of surgical weight loss procedures and they have considered these options after our initial consultation. We have once again discussed these procedures in detail and they have now decided on a surgical procedure. They present today to discuss this and confirm that their evaluation pre operatively is acceptable to continue with surgery. The patient desires laparoscopic sleeve gastrectomy for surgical weight loss. The patients goal weight is 250lb. These goals are consistent with expected outcomes of their desired operation. his Medical goals are resolution of these health issues.     Patient Active Problem List    Diagnosis Date Noted    Diabetes mellitus ([facility]     Sleep apnea     Anemia     Morbid obesity with body mass index (BMI) of 60.0 to 69.9 in adult [facility])     Morbid obesity (HCC)     Anxiety     Chronic back pain     Hypertension     Snores     GERD (gastroesophageal reflux disease)     Sleep disorder breathing      Past Surgical History:   Procedure Laterality Date    HX OTHER SURGICAL      resection of benign cyst from neck      Social History     Tobacco Use    Smoking status: Never Smoker    Smokeless tobacco: Never Used   Substance Use Topics    Alcohol use: No      Family History   Problem Relation Age of Onset    Arthritis-rheumatoid Mother       Current Outpatient Medications   Medication Sig Dispense Refill    losartan (COZAAR) 50 mg tablet Take  by mouth daily.  ibuprofen (MOTRIN) 600 mg tablet       amLODIPine (NORVASC) 10 mg tablet Take 10 mg by mouth daily.  metFORMIN (GLUCOPHAGE) 500 mg tablet Take 500 mg by mouth two (2) times daily (with meals).  enoxaparin (LOVENOX) 40 mg/0.4 mL 0.4 mL by SubCUTAneous route every twelve (12) hours for 14 days. Indications: deep vein thrombosis prevention (Patient not taking: Reported on 6/27/2022) 28 Each 0    ondansetron (ZOFRAN ODT) 8 mg disintegrating tablet Take 1 Tablet by mouth every eight (8) hours as needed for Nausea or Vomiting.  (Patient not taking: Reported on 6/27/2022) 30 Tablet 0     Allergies   Allergen Reactions    Penicillins Other (comments)     Since he was a kid          Review of Systems:     General - No history or complaints of unexpected fever, chills, or weight loss  Head/Neck - No history or complaints of headache, diplopia, dysphagia, hearing loss  Cardiac - No history or complaints of chest pain, palpitations, murmur, or shortness of breath  Pulmonary - No history or complaints of shortness of breath, productive cough, hemoptysis  Gastrointestinal - mild reflux,no  abdominal pain, obstipation/constipation or blood per rectum  Genitourinary - No history or complaints of hematuria/dysuria, stress urinary incontinence symptoms, or renal lithiasis  Musculoskeletal - mild joint pain,  no muscular weakness  Hematologic - No history or complaints of bleeding disorders,  No blood transfusions  Neurologic - No history or complaints of  migraine headaches, seizure activity, syncopal episodes, TIA or stroke  Integumentary - No history or complaints of rashes, abnormal nevi, skin cancer  Gynecological - n/a               Objective:     Visit Vitals  BP (!) 145/86   Pulse 98   Temp 97.1 °F (36.2 °C)   Ht 6' (1.829 m)   Wt (!) 205.4 kg (452 lb 12.8 oz)   SpO2 96%   BMI 61.41 kg/m²       Physical Examination: General appearance - alert, well appearing, and in no distress and oriented to person, place, and time  Mental status - alert, oriented to person, place, and time, normal mood, behavior, speech, dress, motor activity, and thought processes  Eyes - pupils equal and reactive, extraocular eye movements intact, sclera anicteric, left eye normal, right eye normal  Ears - right ear normal, left ear normal  Nose - normal and patent, no erythema, discharge or polyps  Mouth - mucous membranes moist, pharynx normal without lesions  Neck - supple, no significant adenopathy  Lymphatics - no palpable lymphadenopathy, no hepatosplenomegaly  Chest - clear to auscultation, no wheezes, rales or rhonchi, symmetric air entry  Heart - normal rate, regular rhythm, normal S1, S2, no murmurs, rubs, clicks or gallops  Abdomen - soft, nontender, nondistended, no masses or organomegaly, moderate central obesity  Back exam - full range of motion, no tenderness, palpable spasm or pain on motion  Neurological - alert, oriented, normal speech, no focal findings or movement disorder noted  Musculoskeletal - no joint tenderness, deformity or swelling  Extremities - peripheral pulses normal, no pedal edema, no clubbing or cyanosis  Skin - normal coloration and turgor, no rashes, no suspicious skin lesions noted    Labs :     Lab Results   Component Value Date/Time WBC 4.9 2022 11:13 AM    HGB 13.4 2022 11:13 AM    HCT 44.4 2022 11:13 AM    PLATELET 554  11:13 AM    MCV 88.1 2022 11:13 AM     Lab Results   Component Value Date/Time    Sodium 142 2022 11:13 AM    Potassium 4.0 2022 11:13 AM    Chloride 108 2022 11:13 AM    CO2 31 2022 11:13 AM    Anion gap 3 2022 11:13 AM    Glucose 97 2022 11:13 AM    BUN 11 2022 11:13 AM    Creatinine 0.97 2022 11:13 AM    BUN/Creatinine ratio 11 (L) 2022 11:13 AM    GFR est AA >60 2022 11:13 AM    GFR est non-AA >60 2022 11:13 AM    Calcium 8.9 2022 11:13 AM    Bilirubin, total 0.2 2022 11:13 AM    Alk. phosphatase 57 2022 11:13 AM    Protein, total 7.6 2022 11:13 AM    Albumin 3.2 (L) 2022 11:13 AM    Globulin 4.4 (H) 2022 11:13 AM    A-G Ratio 0.7 (L) 2022 11:13 AM    ALT (SGPT) 27 2022 11:13 AM     No results found for: IRON, FE, TIBC, IBCT, PSAT, FERR  No results found for: FOL, RBCF  No results found for: Harrison Harness, VD3RIA            Cardiac / Pulmonary Evaluation:           UGI Results:     ZA Moct[place]uma   Physician Assistant   Physician Assistant   Procedures      Signed   Date of Service:  22 1324                         []Hide copied text    []Pascual for details    Esther Stephenson      : 1990  Medical Record [id number]                 PREPROCEDURE DIAGNOSIS: This patient is preoperative for laparoscopic sleeve gastrectomy procedure with a history of  reflux disease.     POSTPROCEDURE DIAGNOSIS: This patient is preoperative for laparoscopic sleeve gastrectomy procedure with a history of  reflux disease.        PROCEDURES PERFORMED: Upper GI study with barium.     ESTIMATED BLOOD LOSS: None.     SPECIMENS: None.     STATEMENT OF MEDICAL NECESSITY: The patient is a patient with a  longstanding history of obesity.  They are now considering the laparoscopic sleeve gastrectomy procedure as a means of surgical weight control and due to their history of reflux disease and are being assessed preoperatively for such.     DESCRIPTION OF PROCEDURE: The patient was brought to the fluoroscopy unit and  was given thin barium. On swallowing of barium, they were noted to have  normal peristalsis of their esophagus. They had prompt filling of distal  esophagus with tapering into the gastroesophageal junction. There was no evidence of a hiatal hernia present. Contrast then filled the gastric cardia, fundus,body and pre pyloric region with no abnormalities noted. Contrast then exited the pylorus in normal fashion. No obstruction was noted. There was no evidence of reflux noted.     (normal anatomy)     Ida Bruno MD      Cosigned by: Claudean Berry, MD at 03/31/22 2850           Assessment:     Morbid obesity with comorbidity    Plan:     laparoscopic sleeve gastrectomy    This is a 33 y.o. male with a BMI of Body mass index is 61.41 kg/m². and the weight-related co-morbidties as noted above. Jerome Hartley meets the NIH criteria for bariatric surgery based upon the BMI of Body mass index is 61.41 kg/m². and multiple weight-related co-morbidties. Jerome Hartley has elected laparoscopic sleeve gastrectomy as his intervention of choice for treatment of morbid obestiy through surgical means secondary to its safety profile, rapid return to work  and decreases in operative risks over gastric bypass. In the office today, following Randall's history and physical examination, a 40-50 minute discussion regarding the anatomic alterations for the laparoscopic sleeve gastrectomy was undertaken. The dietary expectations and the patient  dependent factors for success were thoroughly discussed, to include the need for interval follow-up and long-term dietary changes associated with success.  The possible complications of the sleeve gastrectomy  were also discussed, to include;death, DVT/PE, staple line leak, bleeding, stricture formation, infection, nutritional deficiencies and sleeve dilation. Specific weight related outcomes for success were also discussed with an emphasis on careful and close follow-up with the first year and eating behavior modification as the baseline and cyclical hunger return. The patient expressed an understanding of the above factors, and his questions were answered in their entirety. In addition, the patient attended a 1.5 hour power point seminar regarding obesity, surgical weight loss including, adjustable gastric band, gastric bypass, and sleeve gastrectomy. This discussion contrasted the different surgical techniques, mechanisms of actions and expected outcomes, and surgical and medical risks associated with each procedure. During this seminar, there was a long question and answer session where each questions was answered until there were no additional questions. Today, the patient had all of his questions answered and the decision was made today that the patient's preoperative evaluation is acceptable for them  to proceed with bariatric surgery  choosing the sleeve gastrectomy as his surgical option. Secondary Diagnoses:     Adult Onset Diabetes - the patient understands, and we have discussed in detail, that this is an active acute health problem that has a multifactorial effect on their body from the standpoint of healing and general health. They understand that diabetes creates inflammation within the body that affects other organ processes such as cardiac function and renal function which are integral in the bodies homeostasis. This lack of maintenance of Accu-Cheks within the normal range leads to risk associated with surgical procedures. They understand that in addition to diabetes, once they develop other health issues, their risk is exponentially worse.   Currently they understand that this likely is the single most important item that they need to control in the perioperative process. They understand that the [facility] protocol is that patients entering the operative suite, should have an A1c less than 8.5 prior to surgery. Based on Accu-Chek readings this would mean that there daily Accu-Cheks are in the 180 range or less. Today based on my review of their care everywhere portion of the chart and or notes from their primary care physician, there hemoglobin A1c level is in the range of 6.9. The patient has been given a very low carbohydrate diet preoperatively along with instructions to monitor their blood sugars on a regular daily basis. When  their surgery is performed  we will be monitoring the patient with sliding scale insulin and accuchecks. Based on those values we will determine whether the patient needs a reduction of those medications postoperatively or total removal of those medications on discharge. We will have the patient continue acuchecks postoperatively while at home also and report to me or their family physician for appropriate adjustments as needed. The patient also understands that in the event of uncontrolled blood sugar preoperatively that we may choose to postpone their surgery. DVT / Pulmonary Embolus Risk - The patient is at a higher risk for post operative DVT / pulmonary embolus secondary to their morbid obese status, relative sedentary   lifestyle, and impending general anesthetic. We will plan to use anticoagulation therapy pre and post operative as well as TEDs and  pneumatic compression devices and   encourage ambulation once on the hospital nursing floor. The need for  at home anticoagulation therapy has also been discussed. The patient understands   that their efforts at ambulation are of vital importance to reduce the risk of this complication thus placing significant burden on them as to the prevention of such issues.    Signs and symptoms of DVT / PE have been discussed with the patient and they have been instructed to call the office if any these occur in the \"at home\" post op phase. The patient has been provided with a post operative prescription of Lovenox and instructed in its use for DVT prophylaxis. GERD -The patient understands that weight loss surgery is not a guaranteed cure for reflux disease but does understand the benefits that weight loss can have on reflux disease. They also understand that at the time of surgery the gastroesophageal junction will be evaluated for the presence of a diaphragmatic hernia. Hernias will be corrected always with the surgical procedure if possible and is deemed safe. The patient also understands that neither weight loss surgery nor repair of a diaphragmatic hernia repair guarantees the complete cessation of the disease. They also understand there is a possibility of recurrence of these hernias with a simple crural repair as is performed with these procedures. They understand they may have to continue their medications in the postoperative period. They have a good understanding that the gastric bypass procedure is better suited to total resolution of this issue and that neither the Lap Band or sleeve gastrectomy is considered a curative procedure as it pertains to this diagnosis. Hypertension - the patient understands, and we have discussed in detail, that this is an active acute health problem that has a multifactorial effect on their body from the standpoint of healing. They understand that uncontrolled hypertension affects other organ processes and this leads to risk associated with surgical procedures. They understand that in addition to hypertension, once they develop other health issues, their risk is exponentially worse. Currently they understand that this likely one of the single most important items that they need to control in the perioperative process.   They understand that the hospitals protocol is that patients entering the operative suite should have a blood pressure reading less than 140/90 prior to surgery. Elevated readings today in office above 160/90 are recommended emergent PCP follow-up or if symptomatic to proceed to the ED. The patient has a clear understanding of how weight loss improves hypertension as a whole, but also they understand that there is a significant genetic component to this disease process. We will monitor the patients blood pressure while in the hospital and the plan would be to continue those medications postoperatively. If a diuretic is being used we will stop them on discharge to prevent dehydration particularly with the sleeve gastrectomy and the gastric bypass procedures. They will be instructed to monitor their blood pressure postoperatively while at home and notify their primary care physician in the event of any significantly high or uncharacteristic readings. Obstructive Sleep Apnea -The patient understands the association of sleep apnea and obesity and the additional risk that it caries related to post surgical complications. If they have not been tested for sleep apnea and I feel they are at increased risk for this diagnosis, then they will be scheduled for a consultation with a Pulmonologist for such. In the event that they josef this diagnosis we will have the patient bring their CPAP machine to the hospital for use both postoperatively in the PACU and on the floor at its appropriate setting.  We will have them continue using it while at home after surgery and follow up with their pulmonologist 6 months after to be retested to see if it can be discontinued at that time period.           Signed By: Thierno Hu MD     June 27, 2022

## 2022-06-28 LAB
ALBUMIN SERPL-MCNC: 4.1 G/DL (ref 4–5)
ALBUMIN/GLOB SERPL: 1.2 {RATIO} (ref 1.2–2.2)
ALP SERPL-CCNC: 44 IU/L (ref 44–121)
ALT SERPL-CCNC: 25 IU/L (ref 0–44)
AST SERPL-CCNC: 22 IU/L (ref 0–40)
BASOPHILS # BLD AUTO: 0 X10E3/UL (ref 0–0.2)
BASOPHILS NFR BLD AUTO: 1 %
BILIRUB SERPL-MCNC: 0.3 MG/DL (ref 0–1.2)
BUN SERPL-MCNC: 10 MG/DL (ref 6–20)
BUN/CREAT SERPL: 9 (ref 9–20)
CALCIUM SERPL-MCNC: 9.6 MG/DL (ref 8.7–10.2)
CHLORIDE SERPL-SCNC: 104 MMOL/L (ref 96–106)
CO2 SERPL-SCNC: 23 MMOL/L (ref 20–29)
CREAT SERPL-MCNC: 1.15 MG/DL (ref 0.76–1.27)
EGFR: 86 ML/MIN/1.73
EOSINOPHIL # BLD AUTO: 0.3 X10E3/UL (ref 0–0.4)
EOSINOPHIL NFR BLD AUTO: 6 %
ERYTHROCYTE [DISTWIDTH] IN BLOOD BY AUTOMATED COUNT: 15.2 % (ref 11.6–15.4)
GLOBULIN SER CALC-MCNC: 3.4 G/DL (ref 1.5–4.5)
GLUCOSE SERPL-MCNC: 92 MG/DL (ref 65–99)
HCT VFR BLD AUTO: 46.3 % (ref 37.5–51)
HGB BLD-MCNC: 14 G/DL (ref 13–17.7)
IMM GRANULOCYTES # BLD AUTO: 0 X10E3/UL (ref 0–0.1)
IMM GRANULOCYTES NFR BLD AUTO: 0 %
LYMPHOCYTES # BLD AUTO: 2.2 X10E3/UL (ref 0.7–3.1)
LYMPHOCYTES NFR BLD AUTO: 43 %
MCH RBC QN AUTO: 25.9 PG (ref 26.6–33)
MCHC RBC AUTO-ENTMCNC: 30.2 G/DL (ref 31.5–35.7)
MCV RBC AUTO: 86 FL (ref 79–97)
MONOCYTES # BLD AUTO: 0.6 X10E3/UL (ref 0.1–0.9)
MONOCYTES NFR BLD AUTO: 12 %
NEUTROPHILS # BLD AUTO: 1.9 X10E3/UL (ref 1.4–7)
NEUTROPHILS NFR BLD AUTO: 38 %
PLATELET # BLD AUTO: 204 X10E3/UL (ref 150–450)
POTASSIUM SERPL-SCNC: 4.1 MMOL/L (ref 3.5–5.2)
PROT SERPL-MCNC: 7.5 G/DL (ref 6–8.5)
RBC # BLD AUTO: 5.4 X10E6/UL (ref 4.14–5.8)
SODIUM SERPL-SCNC: 144 MMOL/L (ref 134–144)
SPECIMEN STATUS REPORT, ROLRST: NORMAL
WBC # BLD AUTO: 5 X10E3/UL (ref 3.4–10.8)

## 2022-07-06 ENCOUNTER — ANESTHESIA EVENT (OUTPATIENT)
Dept: SURGERY | Age: 32
DRG: 403 | End: 2022-07-06
Payer: MEDICAID

## 2022-07-07 ENCOUNTER — HOSPITAL ENCOUNTER (INPATIENT)
Age: 32
LOS: 1 days | Discharge: HOME | DRG: 403 | End: 2022-07-08
Attending: SPECIALIST | Admitting: SPECIALIST
Payer: MEDICAID

## 2022-07-07 ENCOUNTER — APPOINTMENT (OUTPATIENT)
Dept: SURGERY | Age: 32
End: 2022-07-07

## 2022-07-07 ENCOUNTER — ANESTHESIA (OUTPATIENT)
Dept: SURGERY | Age: 32
DRG: 403 | End: 2022-07-07
Payer: MEDICAID

## 2022-07-07 DIAGNOSIS — E66.01 MORBID OBESITY WITH BMI OF 60.0-69.9, ADULT (HCC): ICD-10-CM

## 2022-07-07 LAB
ABO + RH BLD: NORMAL
BLOOD GROUP ANTIBODIES SERPL: NORMAL
GLUCOSE BLD STRIP.AUTO-MCNC: 100 MG/DL (ref 70–110)
GLUCOSE BLD STRIP.AUTO-MCNC: 128 MG/DL (ref 70–110)
GLUCOSE BLD STRIP.AUTO-MCNC: 131 MG/DL (ref 70–110)
GLUCOSE BLD STRIP.AUTO-MCNC: 169 MG/DL (ref 70–110)
SPECIMEN EXP DATE BLD: NORMAL

## 2022-07-07 PROCEDURE — 77030034154 HC SHR COAG HARM ACE J&J -F: Performed by: SPECIALIST

## 2022-07-07 PROCEDURE — 74011250636 HC RX REV CODE- 250/636: Performed by: SPECIALIST

## 2022-07-07 PROCEDURE — 76010000153 HC OR TIME 1.5 TO 2 HR: Performed by: SPECIALIST

## 2022-07-07 PROCEDURE — 77030013079 HC BLNKT BAIR HGGR 3M -A: Performed by: STUDENT IN AN ORGANIZED HEALTH CARE EDUCATION/TRAINING PROGRAM

## 2022-07-07 PROCEDURE — 77030002933 HC SUT MCRYL J&J -A: Performed by: SPECIALIST

## 2022-07-07 PROCEDURE — 77030040506 HC DRN WND MDII -A: Performed by: SPECIALIST

## 2022-07-07 PROCEDURE — 88305 TISSUE EXAM BY PATHOLOGIST: CPT

## 2022-07-07 PROCEDURE — 77030034029 HC SLV GASTRCTMY CAL SYS DISP BOEH -C: Performed by: SPECIALIST

## 2022-07-07 PROCEDURE — 43775 LAP SLEEVE GASTRECTOMY: CPT | Performed by: SPECIALIST

## 2022-07-07 PROCEDURE — 76210000006 HC OR PH I REC 0.5 TO 1 HR: Performed by: SPECIALIST

## 2022-07-07 PROCEDURE — 77030008602 HC TRCR ENDOSC EPATH J&J -B: Performed by: SPECIALIST

## 2022-07-07 PROCEDURE — 36415 COLL VENOUS BLD VENIPUNCTURE: CPT

## 2022-07-07 PROCEDURE — 77010033678 HC OXYGEN DAILY

## 2022-07-07 PROCEDURE — 88313 SPECIAL STAINS GROUP 2: CPT

## 2022-07-07 PROCEDURE — 77030008603 HC TRCR ENDOSC EPATH J&J -C: Performed by: SPECIALIST

## 2022-07-07 PROCEDURE — 77030008574 HC TBNG SUC IRR STRY -B: Performed by: SPECIALIST

## 2022-07-07 PROCEDURE — 74011250636 HC RX REV CODE- 250/636: Performed by: STUDENT IN AN ORGANIZED HEALTH CARE EDUCATION/TRAINING PROGRAM

## 2022-07-07 PROCEDURE — 77030009426 HC FCPS BIOP ENDOSC BSC -B: Performed by: SPECIALIST

## 2022-07-07 PROCEDURE — 76060000034 HC ANESTHESIA 1.5 TO 2 HR: Performed by: SPECIALIST

## 2022-07-07 PROCEDURE — 0DB64Z3 EXCISION OF STOMACH, PERCUTANEOUS ENDOSCOPIC APPROACH, VERTICAL: ICD-10-PCS | Performed by: SPECIALIST

## 2022-07-07 PROCEDURE — 77030020782 HC GWN BAIR PAWS FLX 3M -B: Performed by: SPECIALIST

## 2022-07-07 PROCEDURE — 77030009968 HC RELD STPLR ENDOSC J&J -D: Performed by: SPECIALIST

## 2022-07-07 PROCEDURE — 77030008683 HC TU ET CUF COVD -A: Performed by: STUDENT IN AN ORGANIZED HEALTH CARE EDUCATION/TRAINING PROGRAM

## 2022-07-07 PROCEDURE — 77030039961 HC KT CUST COLON BSC -D: Performed by: SPECIALIST

## 2022-07-07 PROCEDURE — 74011000250 HC RX REV CODE- 250: Performed by: STUDENT IN AN ORGANIZED HEALTH CARE EDUCATION/TRAINING PROGRAM

## 2022-07-07 PROCEDURE — 77030040361 HC SLV COMPR DVT MDII -B: Performed by: SPECIALIST

## 2022-07-07 PROCEDURE — 77030002916 HC SUT ETHLN J&J -A: Performed by: SPECIALIST

## 2022-07-07 PROCEDURE — 77030002966 HC SUT PDS J&J -A: Performed by: SPECIALIST

## 2022-07-07 PROCEDURE — 77030033271 HC TRCR ENDOSC EPATH2 J&J -B: Performed by: SPECIALIST

## 2022-07-07 PROCEDURE — 77030010515 HC APPL ENDOCLP LIG J&J -B: Performed by: SPECIALIST

## 2022-07-07 PROCEDURE — 77030010030: Performed by: SPECIALIST

## 2022-07-07 PROCEDURE — 77030008518 HC TBNG INSUF ENDO STRY -B: Performed by: SPECIALIST

## 2022-07-07 PROCEDURE — 88307 TISSUE EXAM BY PATHOLOGIST: CPT

## 2022-07-07 PROCEDURE — 77030016151 HC PROTCTR LNS DFOG COVD -B: Performed by: SPECIALIST

## 2022-07-07 PROCEDURE — 77030002912 HC SUT ETHBND J&J -A: Performed by: SPECIALIST

## 2022-07-07 PROCEDURE — 77030041523 HC SEALNT FIBRN VITASEAL J&J -E: Performed by: SPECIALIST

## 2022-07-07 PROCEDURE — 77030002901 HC SUT DEV MCLOSE MPSA - B: Performed by: SPECIALIST

## 2022-07-07 PROCEDURE — 74011000250 HC RX REV CODE- 250: Performed by: SPECIALIST

## 2022-07-07 PROCEDURE — 77030006643: Performed by: STUDENT IN AN ORGANIZED HEALTH CARE EDUCATION/TRAINING PROGRAM

## 2022-07-07 PROCEDURE — 77030014008 HC SPNG HEMSTAT J&J -C: Performed by: SPECIALIST

## 2022-07-07 PROCEDURE — 74011250637 HC RX REV CODE- 250/637: Performed by: SPECIALIST

## 2022-07-07 PROCEDURE — 77030011808 HC STPLR ENDOSCOPIC J&J -D: Performed by: SPECIALIST

## 2022-07-07 PROCEDURE — 0FB24ZX EXCISION OF LEFT LOBE LIVER, PERCUTANEOUS ENDOSCOPIC APPROACH, DIAGNOSTIC: ICD-10-PCS | Performed by: SPECIALIST

## 2022-07-07 PROCEDURE — 2709999900 HC NON-CHARGEABLE SUPPLY: Performed by: SPECIALIST

## 2022-07-07 PROCEDURE — 77030008477 HC STYL SATN SLP COVD -A: Performed by: STUDENT IN AN ORGANIZED HEALTH CARE EDUCATION/TRAINING PROGRAM

## 2022-07-07 PROCEDURE — 0DB78ZX EXCISION OF STOMACH, PYLORUS, VIA NATURAL OR ARTIFICIAL OPENING ENDOSCOPIC, DIAGNOSTIC: ICD-10-PCS | Performed by: SPECIALIST

## 2022-07-07 PROCEDURE — 77030003580 HC NDL INSUF VERES J&J -B: Performed by: SPECIALIST

## 2022-07-07 PROCEDURE — 77030027138 HC INCENT SPIROMETER -A: Performed by: SPECIALIST

## 2022-07-07 PROCEDURE — 74011636637 HC RX REV CODE- 636/637: Performed by: SPECIALIST

## 2022-07-07 PROCEDURE — 47379 UNLISTED LAPS PX LIVER: CPT | Performed by: SPECIALIST

## 2022-07-07 PROCEDURE — 82962 GLUCOSE BLOOD TEST: CPT

## 2022-07-07 PROCEDURE — 86900 BLOOD TYPING SEROLOGIC ABO: CPT

## 2022-07-07 PROCEDURE — 77030041460 HC APL VISTASEAL J&J -B: Performed by: SPECIALIST

## 2022-07-07 PROCEDURE — 65270000029 HC RM PRIVATE

## 2022-07-07 RX ORDER — ONDANSETRON 2 MG/ML
4 INJECTION INTRAMUSCULAR; INTRAVENOUS
Status: DISCONTINUED | OUTPATIENT
Start: 2022-07-07 | End: 2022-07-08 | Stop reason: HOSPADM

## 2022-07-07 RX ORDER — ONDANSETRON 2 MG/ML
4 INJECTION INTRAMUSCULAR; INTRAVENOUS ONCE
Status: DISCONTINUED | OUTPATIENT
Start: 2022-07-07 | End: 2022-07-07 | Stop reason: HOSPADM

## 2022-07-07 RX ORDER — NYSTATIN 100000 [USP'U]/ML
500000 SUSPENSION ORAL
Status: COMPLETED | OUTPATIENT
Start: 2022-07-07 | End: 2022-07-07

## 2022-07-07 RX ORDER — METOCLOPRAMIDE HYDROCHLORIDE 5 MG/ML
10 INJECTION INTRAMUSCULAR; INTRAVENOUS EVERY 6 HOURS
Status: DISCONTINUED | OUTPATIENT
Start: 2022-07-07 | End: 2022-07-08 | Stop reason: HOSPADM

## 2022-07-07 RX ORDER — ENOXAPARIN SODIUM 100 MG/ML
40 INJECTION SUBCUTANEOUS ONCE
Status: COMPLETED | OUTPATIENT
Start: 2022-07-07 | End: 2022-07-07

## 2022-07-07 RX ORDER — ACETAMINOPHEN 500 MG
1000 TABLET ORAL ONCE
Status: COMPLETED | OUTPATIENT
Start: 2022-07-07 | End: 2022-07-07

## 2022-07-07 RX ORDER — ROCURONIUM BROMIDE 10 MG/ML
INJECTION, SOLUTION INTRAVENOUS AS NEEDED
Status: DISCONTINUED | OUTPATIENT
Start: 2022-07-07 | End: 2022-07-07 | Stop reason: HOSPADM

## 2022-07-07 RX ORDER — FENTANYL CITRATE 50 UG/ML
INJECTION, SOLUTION INTRAMUSCULAR; INTRAVENOUS AS NEEDED
Status: DISCONTINUED | OUTPATIENT
Start: 2022-07-07 | End: 2022-07-07 | Stop reason: HOSPADM

## 2022-07-07 RX ORDER — ALBUTEROL SULFATE 0.83 MG/ML
2.5 SOLUTION RESPIRATORY (INHALATION)
Status: DISCONTINUED | OUTPATIENT
Start: 2022-07-07 | End: 2022-07-07 | Stop reason: HOSPADM

## 2022-07-07 RX ORDER — SODIUM CHLORIDE 0.9 % (FLUSH) 0.9 %
5-40 SYRINGE (ML) INJECTION EVERY 8 HOURS
Status: DISCONTINUED | OUTPATIENT
Start: 2022-07-07 | End: 2022-07-07 | Stop reason: HOSPADM

## 2022-07-07 RX ORDER — BUPIVACAINE HYDROCHLORIDE AND EPINEPHRINE 2.5; 5 MG/ML; UG/ML
INJECTION, SOLUTION EPIDURAL; INFILTRATION; INTRACAUDAL; PERINEURAL AS NEEDED
Status: DISCONTINUED | OUTPATIENT
Start: 2022-07-07 | End: 2022-07-07 | Stop reason: HOSPADM

## 2022-07-07 RX ORDER — DEXTROSE MONOHYDRATE 100 MG/ML
0-250 INJECTION, SOLUTION INTRAVENOUS AS NEEDED
Status: DISCONTINUED | OUTPATIENT
Start: 2022-07-07 | End: 2022-07-08 | Stop reason: HOSPADM

## 2022-07-07 RX ORDER — KETOROLAC TROMETHAMINE 30 MG/ML
30 INJECTION, SOLUTION INTRAMUSCULAR; INTRAVENOUS EVERY 6 HOURS
Status: DISCONTINUED | OUTPATIENT
Start: 2022-07-07 | End: 2022-07-08 | Stop reason: HOSPADM

## 2022-07-07 RX ORDER — DIPHENHYDRAMINE HYDROCHLORIDE 50 MG/ML
12.5 INJECTION, SOLUTION INTRAMUSCULAR; INTRAVENOUS
Status: DISCONTINUED | OUTPATIENT
Start: 2022-07-07 | End: 2022-07-07 | Stop reason: HOSPADM

## 2022-07-07 RX ORDER — CIPROFLOXACIN 2 MG/ML
400 INJECTION, SOLUTION INTRAVENOUS ONCE
Status: COMPLETED | OUTPATIENT
Start: 2022-07-07 | End: 2022-07-07

## 2022-07-07 RX ORDER — NALOXONE HYDROCHLORIDE 0.4 MG/ML
0.04 INJECTION, SOLUTION INTRAMUSCULAR; INTRAVENOUS; SUBCUTANEOUS
Status: DISCONTINUED | OUTPATIENT
Start: 2022-07-07 | End: 2022-07-07 | Stop reason: HOSPADM

## 2022-07-07 RX ORDER — ONDANSETRON 2 MG/ML
INJECTION INTRAMUSCULAR; INTRAVENOUS AS NEEDED
Status: DISCONTINUED | OUTPATIENT
Start: 2022-07-07 | End: 2022-07-07 | Stop reason: HOSPADM

## 2022-07-07 RX ORDER — PROPOFOL 10 MG/ML
INJECTION, EMULSION INTRAVENOUS AS NEEDED
Status: DISCONTINUED | OUTPATIENT
Start: 2022-07-07 | End: 2022-07-07 | Stop reason: HOSPADM

## 2022-07-07 RX ORDER — SODIUM CHLORIDE, SODIUM LACTATE, POTASSIUM CHLORIDE, CALCIUM CHLORIDE 600; 310; 30; 20 MG/100ML; MG/100ML; MG/100ML; MG/100ML
125 INJECTION, SOLUTION INTRAVENOUS CONTINUOUS
Status: DISCONTINUED | OUTPATIENT
Start: 2022-07-07 | End: 2022-07-07 | Stop reason: HOSPADM

## 2022-07-07 RX ORDER — HYDROMORPHONE HYDROCHLORIDE 1 MG/ML
0.5 INJECTION, SOLUTION INTRAMUSCULAR; INTRAVENOUS; SUBCUTANEOUS AS NEEDED
Status: DISCONTINUED | OUTPATIENT
Start: 2022-07-07 | End: 2022-07-07 | Stop reason: HOSPADM

## 2022-07-07 RX ORDER — NALBUPHINE HYDROCHLORIDE 10 MG/ML
5 INJECTION, SOLUTION INTRAMUSCULAR; INTRAVENOUS; SUBCUTANEOUS
Status: DISCONTINUED | OUTPATIENT
Start: 2022-07-07 | End: 2022-07-07 | Stop reason: HOSPADM

## 2022-07-07 RX ORDER — DEXAMETHASONE SODIUM PHOSPHATE 4 MG/ML
INJECTION, SOLUTION INTRA-ARTICULAR; INTRALESIONAL; INTRAMUSCULAR; INTRAVENOUS; SOFT TISSUE AS NEEDED
Status: DISCONTINUED | OUTPATIENT
Start: 2022-07-07 | End: 2022-07-07 | Stop reason: HOSPADM

## 2022-07-07 RX ORDER — SODIUM CHLORIDE, SODIUM LACTATE, POTASSIUM CHLORIDE, CALCIUM CHLORIDE 600; 310; 30; 20 MG/100ML; MG/100ML; MG/100ML; MG/100ML
50 INJECTION, SOLUTION INTRAVENOUS CONTINUOUS
Status: DISCONTINUED | OUTPATIENT
Start: 2022-07-07 | End: 2022-07-07 | Stop reason: HOSPADM

## 2022-07-07 RX ORDER — SODIUM CHLORIDE, SODIUM LACTATE, POTASSIUM CHLORIDE, CALCIUM CHLORIDE 600; 310; 30; 20 MG/100ML; MG/100ML; MG/100ML; MG/100ML
150 INJECTION, SOLUTION INTRAVENOUS CONTINUOUS
Status: DISCONTINUED | OUTPATIENT
Start: 2022-07-07 | End: 2022-07-08 | Stop reason: HOSPADM

## 2022-07-07 RX ORDER — NALOXONE HYDROCHLORIDE 0.4 MG/ML
0.4 INJECTION, SOLUTION INTRAMUSCULAR; INTRAVENOUS; SUBCUTANEOUS AS NEEDED
Status: DISCONTINUED | OUTPATIENT
Start: 2022-07-07 | End: 2022-07-08 | Stop reason: HOSPADM

## 2022-07-07 RX ORDER — CLONIDINE 0.3 MG/24H
1 PATCH, EXTENDED RELEASE TRANSDERMAL
Status: DISCONTINUED | OUTPATIENT
Start: 2022-07-07 | End: 2022-07-08 | Stop reason: HOSPADM

## 2022-07-07 RX ORDER — ENOXAPARIN SODIUM 100 MG/ML
40 INJECTION SUBCUTANEOUS EVERY 12 HOURS
Status: DISCONTINUED | OUTPATIENT
Start: 2022-07-07 | End: 2022-07-08 | Stop reason: HOSPADM

## 2022-07-07 RX ORDER — FENTANYL CITRATE 50 UG/ML
50 INJECTION, SOLUTION INTRAMUSCULAR; INTRAVENOUS
Status: DISCONTINUED | OUTPATIENT
Start: 2022-07-07 | End: 2022-07-07 | Stop reason: HOSPADM

## 2022-07-07 RX ORDER — GLYCOPYRROLATE 0.2 MG/ML
INJECTION INTRAMUSCULAR; INTRAVENOUS AS NEEDED
Status: DISCONTINUED | OUTPATIENT
Start: 2022-07-07 | End: 2022-07-07 | Stop reason: HOSPADM

## 2022-07-07 RX ORDER — MIDAZOLAM HYDROCHLORIDE 1 MG/ML
INJECTION INTRAMUSCULAR; INTRAVENOUS AS NEEDED
Status: DISCONTINUED | OUTPATIENT
Start: 2022-07-07 | End: 2022-07-07 | Stop reason: HOSPADM

## 2022-07-07 RX ORDER — MORPHINE SULFATE 4 MG/ML
8 INJECTION INTRAVENOUS
Status: DISCONTINUED | OUTPATIENT
Start: 2022-07-07 | End: 2022-07-08

## 2022-07-07 RX ORDER — SODIUM CHLORIDE 0.9 % (FLUSH) 0.9 %
5-40 SYRINGE (ML) INJECTION AS NEEDED
Status: DISCONTINUED | OUTPATIENT
Start: 2022-07-07 | End: 2022-07-07 | Stop reason: HOSPADM

## 2022-07-07 RX ORDER — LIDOCAINE HYDROCHLORIDE 20 MG/ML
INJECTION, SOLUTION EPIDURAL; INFILTRATION; INTRACAUDAL; PERINEURAL AS NEEDED
Status: DISCONTINUED | OUTPATIENT
Start: 2022-07-07 | End: 2022-07-07 | Stop reason: HOSPADM

## 2022-07-07 RX ORDER — INSULIN LISPRO 100 [IU]/ML
INJECTION, SOLUTION INTRAVENOUS; SUBCUTANEOUS EVERY 6 HOURS
Status: DISCONTINUED | OUTPATIENT
Start: 2022-07-07 | End: 2022-07-08 | Stop reason: HOSPADM

## 2022-07-07 RX ORDER — FAMOTIDINE 10 MG/ML
20 INJECTION INTRAVENOUS ONCE
Status: COMPLETED | OUTPATIENT
Start: 2022-07-07 | End: 2022-07-07

## 2022-07-07 RX ADMIN — MORPHINE SULFATE 8 MG: 4 INJECTION INTRAVENOUS at 10:10

## 2022-07-07 RX ADMIN — SODIUM CHLORIDE, POTASSIUM CHLORIDE, SODIUM LACTATE AND CALCIUM CHLORIDE 150 ML/HR: 600; 310; 30; 20 INJECTION, SOLUTION INTRAVENOUS at 17:52

## 2022-07-07 RX ADMIN — SODIUM CHLORIDE, POTASSIUM CHLORIDE, SODIUM LACTATE AND CALCIUM CHLORIDE 150 ML/HR: 600; 310; 30; 20 INJECTION, SOLUTION INTRAVENOUS at 11:00

## 2022-07-07 RX ADMIN — INSULIN LISPRO 3 UNITS: 100 INJECTION, SOLUTION INTRAVENOUS; SUBCUTANEOUS at 11:58

## 2022-07-07 RX ADMIN — CIPROFLOXACIN 400 MG: 2 INJECTION, SOLUTION INTRAVENOUS at 07:36

## 2022-07-07 RX ADMIN — GLYCOPYRROLATE 0.2 MG: 0.2 INJECTION INTRAMUSCULAR; INTRAVENOUS at 07:19

## 2022-07-07 RX ADMIN — KETOROLAC TROMETHAMINE 30 MG: 30 INJECTION, SOLUTION INTRAMUSCULAR at 11:50

## 2022-07-07 RX ADMIN — ACETAMINOPHEN 1000 MG: 500 TABLET ORAL at 06:33

## 2022-07-07 RX ADMIN — PROPOFOL 400 MG: 10 INJECTION, EMULSION INTRAVENOUS at 07:26

## 2022-07-07 RX ADMIN — DEXAMETHASONE SODIUM PHOSPHATE 8 MG: 4 INJECTION, SOLUTION INTRAMUSCULAR; INTRAVENOUS at 07:27

## 2022-07-07 RX ADMIN — METOCLOPRAMIDE 10 MG: 5 INJECTION, SOLUTION INTRAMUSCULAR; INTRAVENOUS at 17:38

## 2022-07-07 RX ADMIN — FENTANYL CITRATE 75 MCG: 50 INJECTION, SOLUTION INTRAMUSCULAR; INTRAVENOUS at 08:46

## 2022-07-07 RX ADMIN — MIDAZOLAM HYDROCHLORIDE 4 MG: 1 INJECTION, SOLUTION INTRAMUSCULAR; INTRAVENOUS at 07:19

## 2022-07-07 RX ADMIN — SUGAMMADEX 500 MG: 100 INJECTION, SOLUTION INTRAVENOUS at 08:43

## 2022-07-07 RX ADMIN — FENTANYL CITRATE 50 MCG: 50 INJECTION, SOLUTION INTRAMUSCULAR; INTRAVENOUS at 09:08

## 2022-07-07 RX ADMIN — ENOXAPARIN SODIUM 40 MG: 100 INJECTION SUBCUTANEOUS at 17:40

## 2022-07-07 RX ADMIN — ENOXAPARIN SODIUM 40 MG: 100 INJECTION SUBCUTANEOUS at 06:35

## 2022-07-07 RX ADMIN — MORPHINE SULFATE 8 MG: 4 INJECTION INTRAVENOUS at 21:04

## 2022-07-07 RX ADMIN — FENTANYL CITRATE 25 MCG: 50 INJECTION, SOLUTION INTRAMUSCULAR; INTRAVENOUS at 07:49

## 2022-07-07 RX ADMIN — LIDOCAINE HYDROCHLORIDE 200 MG: 20 INJECTION, SOLUTION EPIDURAL; INFILTRATION; INTRACAUDAL; PERINEURAL at 07:26

## 2022-07-07 RX ADMIN — KETOROLAC TROMETHAMINE 30 MG: 30 INJECTION, SOLUTION INTRAMUSCULAR at 17:38

## 2022-07-07 RX ADMIN — ONDANSETRON HYDROCHLORIDE 4 MG: 2 INJECTION INTRAMUSCULAR; INTRAVENOUS at 08:43

## 2022-07-07 RX ADMIN — ROCURONIUM BROMIDE 100 MG: 10 INJECTION, SOLUTION INTRAVENOUS at 07:27

## 2022-07-07 RX ADMIN — NYSTATIN 500000 UNITS: 100000 SUSPENSION ORAL at 06:34

## 2022-07-07 RX ADMIN — SODIUM CHLORIDE, SODIUM LACTATE, POTASSIUM CHLORIDE, AND CALCIUM CHLORIDE: 600; 310; 30; 20 INJECTION, SOLUTION INTRAVENOUS at 08:57

## 2022-07-07 RX ADMIN — FAMOTIDINE 20 MG: 10 INJECTION INTRAVENOUS at 06:36

## 2022-07-07 RX ADMIN — METOCLOPRAMIDE 10 MG: 5 INJECTION, SOLUTION INTRAMUSCULAR; INTRAVENOUS at 10:11

## 2022-07-07 RX ADMIN — FENTANYL CITRATE 50 MCG: 50 INJECTION, SOLUTION INTRAMUSCULAR; INTRAVENOUS at 09:26

## 2022-07-07 RX ADMIN — SODIUM CHLORIDE, SODIUM LACTATE, POTASSIUM CHLORIDE, AND CALCIUM CHLORIDE 125 ML/HR: 600; 310; 30; 20 INJECTION, SOLUTION INTRAVENOUS at 06:36

## 2022-07-07 NOTE — PERIOP NOTES
Reviewed PTA medication list with patient/caregiver and patient/caregiver denies any additional medications. Patient admits to having a responsible adult care for them at home for at least 24 hours after surgery. Patient encouraged to use gown warming system and informed that using said warming gown to regulate body temperature prior to a procedure has been shown to help reduce the risks of blood clots and infection. Patient's pharmacy of choice verified and documented in PTA medication section. Dual skin assessment & fall risk band verification completed with Iraida Mar RN.

## 2022-07-07 NOTE — PERIOP NOTES
TRANSFER - OUT REPORT:    Verbal report given to French Katz on [facility]  being transferred to [address] [place] [address] for routine progression of care       Report consisted of patients Situation, Background, Assessment and   Recommendations(SBAR). Information from the following report(s) SBAR, Kardex, OR Summary and MAR was reviewed with the receiving nurse. Lines:   Peripheral IV 07/07/22 Anterior;Right Forearm (Active)   Site Assessment Clean, dry, & intact 07/07/22 0924   Phlebitis Assessment 0 07/07/22 0924   Infiltration Assessment 0 07/07/22 0924   Dressing Status Clean, dry, & intact 07/07/22 0924   Dressing Type Transparent;Tape 07/07/22 0924   Hub Color/Line Status Green;Patent; Infusing 07/07/22 5681        Opportunity for questions and clarification was provided.       Patient transported with:   O2 @ 2 liters  Registered Nurse  [facility]

## 2022-07-07 NOTE — ANESTHESIA PREPROCEDURE EVALUATION
Relevant Problems   RESPIRATORY SYSTEM   (+) Sleep apnea      CARDIOVASCULAR   (+) Hypertension      GASTROINTESTINAL   (+) GERD (gastroesophageal reflux disease)      ENDOCRINE   (+) Diabetes mellitus (HCC)   (+) Morbid obesity (HCC)      HEMATOLOGY   (+) Anemia       Anesthetic History   No history of anesthetic complications     Pertinent negatives: No PONV       Review of Systems / Medical History  Patient summary reviewed, nursing notes reviewed and pertinent labs reviewed    Pulmonary        Sleep apnea: CPAP      Pertinent negatives: No COPD and asthma     Neuro/Psych   Within defined limits        Pertinent negatives: No seizures and CVA   Cardiovascular    Hypertension            Pertinent negatives: No past MI and CHF       GI/Hepatic/Renal     GERD: well controlled        Pertinent negatives: No liver disease and renal disease   Endo/Other    Diabetes: well controlled    Morbid obesity     Other Findings              Physical Exam    Airway  Mallampati: I  TM Distance: > 6 cm  Neck ROM: normal range of motion   Mouth opening: Normal     Cardiovascular    Rhythm: regular  Rate: normal         Dental  No notable dental hx       Pulmonary      Decreased breath sounds: bilateral           Abdominal  GI exam deferred       Other Findings            Anesthetic Plan    ASA: 3  Anesthesia type: general          Induction: Intravenous  Anesthetic plan and risks discussed with: Patient

## 2022-07-07 NOTE — ROUTINE PROCESS
[id number]  TRANSFER - IN REPORT:    Verbal report received from RENETTA Myers RN(name) on Jennifer Michael  being received from PACU(unit) for routine post - op      Report consisted of patients Situation, Background, Assessment and   Recommendations(SBAR). Information from the following report(s) SBAR, Kardex and MAR was reviewed with the receiving nurse. Opportunity for questions and clarification was provided. Assessment completed upon patients arrival to unit and care assumed.

## 2022-07-07 NOTE — PROGRESS NOTES
conducted an initial consultation and Spiritual Assessment for Tiffanie Rivera, who is a 33 y.o.,male. Patients Primary Language is: [place]. According to the patients EMR Orthodoxy Affiliation is: Kandice Wolff. The reason the Patient came to the hospital is:   Patient Active Problem List    Diagnosis Date Noted    Morbid obesity with BMI of 60.0-69.9, adult ([facility] 07/07/2022    Diabetes mellitus ([facility]     Sleep apnea     Anemia     Morbid obesity with body mass index (BMI) of 60.0 to 69.9 in adult ([facility]     Morbid obesity (HCC)     Anxiety     Chronic back pain     Hypertension     Snores     GERD (gastroesophageal reflux disease)     Sleep disorder breathing         The  provided the following Interventions:  Initiated a relationship of care and support. Explored issues of yessica, belief, spirituality and Jainism/ritual needs while hospitalized. Listened empathically. Provided chaplaincy education. Provided information about Spiritual Care Services. Offered assurance of continued prayers on patients behalf. Chart reviewed. The following outcomes were achieved:  Patient shared limited information about both their medical narrative and spiritual journey/beliefs. Patient processed feeling about current hospitalization. Patient expressed gratitude for pastoral care visit. Assessment:  Patient does not have any Jainism/cultural needs that will affect patients preferences in health care. There are no further spiritual or Jainism issues which require intervention at this time. Plan:  Chaplains will continue to follow and will provide pastoral care on an as needed/requested basis.  recommends bedside caregivers page  on duty if patient shows signs of acute spiritual or emotional distress.       Sister Marin Malone, [place], [address] [phone]

## 2022-07-07 NOTE — PROGRESS NOTES
Transition of Care (DELPHINE) Plan:          Pt admitted for an elective Laparoscopic sleeve gastrectomy surgical procedure. Pt is independent and has family support. Please encourage ambulation. No transition of care needs identified at this time. Anticipate pt will be medically stable for discharge within the next 24-48 hours with physician follow up. CM available to assist as needed. DELPHINE Transportation:   How is patient being transported at discharge? Family/Friend      When? Once cleared by physician     Is transport scheduled? N/A      Follow-up appointment and transportation:   PCP/Specialist?  See AVS for Appointment         Who is transporting to the follow-up appointment? Self/Family/Friend      Is transport for follow up appointment scheduled? N/A    Communication plan (with patient/family): Who is being called? Patient or Next of Kin? Responsible party? Patient      What number(s) is to be used? See Facesheet      What service provider is calling for [facility] services? When are they calling? Readmission Risk? (Green/Low; Yellow/Moderate; Red/High):  [facility] to complete [address] including selection of the Healthcare Decision Maker Relationship (ie \"Primary\")  Care Management Interventions  PCP Verified by CM: Yes  Palliative Care Criteria Met (RRAT>21 & CHF Dx)?: No  Mode of Transport at Discharge: Other (see comment) (family)  Transition of Care Consult (CM Consult):  Other (home )  Support Systems: Spouse/Significant Other  Confirm Follow Up Transport: Family  The Plan for Transition of Care is Related to the Following Treatment Goals : home with family assistance  Discharge Location  Patient Expects to be Discharged to[de-identified] Home with family assistance

## 2022-07-07 NOTE — ROUTINE PROCESS
1907  Bedside and Verbal shift change report given to Gale Pelayo RN (oncoming nurse) by López Pickard RN (offgoing nurse). Report included the following information SBAR, Kardex and MAR.

## 2022-07-07 NOTE — NURSE NAVIGATOR
Patient sleeping throughout visit. No signs of pain nor nausea. Vitals:   Visit Vitals  BP (!) 170/97   Pulse (!) 101   Temp 98.6 °F (37 °C)   Resp 18   Ht 6' (1.829 m)   Wt (!) 201.4 kg (443 lb 14.4 oz)   SpO2 97%   BMI 60.20 kg/m²     Dr. Jorge L Vang informed of BP; Catapress patch ordered. General: Alert & oriented to person, place, time, and situation  Pulmonary: Lungs clear to auscultation in all fields. Cardiac: Regular rate and rhythm  Abdomen: Appropriate tenderness; soft; non-distended; hypo-active bowel sounds  Lap sites: Within normal limits  LUIS drain: Scant amount of serous, maroon drainage  SCD's:  In place and operating WNL    Plan:  -Continue medications for symptom management  -Encourage ambulation  -SCD use when in bed  -IS 10 times an hour  -NPO  -UGI in AM; bariatric full liquid diet  if UGI within normal limits

## 2022-07-07 NOTE — PROGRESS NOTES
4234  Received client from PACU in satisfactory condition. Client is a pt of Dr. Marianne Barber. Pt had a Lap Sleeve gastrectomy,oversew Gastric Staple line,Wedge Liver Bx, and IntraOp Endoscopy with Bx  today. Client is A/O X 4. Client is calm and cooperative. Client denies  numbness or tingling to any extremity. With + Radial,Posterior Tibial and Dorsalis Pedis pulses. Capillary Refill less than 3 seconds. Skin is warm , dry and skin color is appropriate to race. Client is negative for JVD. Bibasilar breath sounds clear. No use of accessory muscles. Bowel sounds hypoactive to all quadrants. Abdomen is soft but tender to touch. Client has not voided post-operatively. No bladder distention evident. No complaints of bladder discomfort. Client has  5 trocar sites to abdomen  With gauze and medipore tapes. Pt has LUIS drain to R abdomen with  Light brownish drainage  In the tubing. . All Dressings are dry and intact. .   Sequential compression device applied. Client has RFA c18 gauge PIV present and running LR at 150 ml/hr. Clients pain is 10/10 on 0-10 scale. Client oriented to call bell use as well as bed use. Client oriented to phone and how to order meals. Call bell within reach. Bed in low position. Three side rails up. Armbands/ fall risk band intact. Dual skin check done with A PORTILLO Myers. No skin breakdown noted. 1010   Medicated with Morphine 8 mg IV for pain level 10/10.    1043   Pt ambulated in room then kneeled on floor and laid his head in bed for a few minutes . Pt stated this is more comfortable. Pt went back to bed after. 1211   Pt's /97. Text message sent to Nurse navigator Sean Lomeli. She is aware and will come to see pt. Fatemeh Lomeli in to see pt. Order for Catapres 0.3 mg  patch in chart. 1258   Pt sleeping on and off but wakes up easily to verbal stimuli. Pain level 5/10. BP at 18 at 183/99. remains elevated .   Clonidine 0.3 mg  Applied to left upper arm.    1332  Pt voided in BR. Pt ambulated in hallway then sat on chair. 1400  Pt is sleeping on and off but wakes up easily to verbal stimuli. Pain level 5/10. /88.  1457  Pt ambulated in hallway then sat on chair. Pt eating ice chips. Pt burped once.

## 2022-07-07 NOTE — PROGRESS NOTES
Problem: Falls - Risk of  Goal: *Absence of Falls  Description: Document Clive Thomas Fall Risk and appropriate interventions in the flowsheet.   Outcome: Progressing Towards Goal  Note: Fall Risk Interventions:  Mobility Interventions: Patient to call before getting OOB    Mentation Interventions: Door open when patient unattended    Medication Interventions: Patient to call before getting OOB    Elimination Interventions: Patient to call for help with toileting needs

## 2022-07-07 NOTE — INTERVAL H&P NOTE
Update History & Physical    The Patient's History and Physical of June 27, 2022 was reviewed with the patient and I examined the patient. There was no change. The surgical site was confirmed by the patient and me. Plan:  The risk, benefits, expected outcome, and alternative to the recommended procedure have been discussed with the patient. Patient understands and wants to proceed with the procedure.     Electronically signed by Thierno Hu MD on 7/7/2022 at 5:58 AM

## 2022-07-07 NOTE — OP NOTES
OPERATIVE REPORT         Patient:Randall Edgar   : 1990  Medical Record [id number]    Pre-operative Diagnosis:  HYPERTENSION, DIABETES, SLEEP APNEA, MORBID OBESITY, BMI 62, FATTY LIVER  Post-operative Diagnosis: HYPERTENSION, DIABETES, SLEEP APNEA, MORBID OBESITY, BMI 62, FATTY LIVER  Procedure: Procedure(s):  LAPAROSCOPIC  SLEEVE GASTRECTOMY  OVERSEW GASTRIC STAPLE LINE  WEDGE LIVER BIOPSY  INTRAOPERATIVE ENDOSCOPY WITH BIOPSY  Location: [facility]  Surgeon: Adilene Burks MD  Assistant:  Ciera Rachel [facility]  - (there are no qualified interns, residents, or any other qualified house   physicians available to assist with this surgery) performed retraction of various structures,  assisted in   creation of the gastric sleeve, fired stapling devices, obtained hemostasis along staple lines via hemoclips,       Anesthesia: General       Specimens: 1. Gastric Sleeve Resection                       2. Liver Wedge Biopsy                       3. Endoscopy biopsy    EBL: less than 5cc  Additional Findings: None  Complications: None             STATEMENT OF MEDICAL NECESSITY: The patient is a 33y.o.-year-old male who has   had a history of obesity. he has failed conservative weight loss measures,   such began to consider weight loss surgical options. he chose the   sleeve gastrectomy as a means of surgical weight control. he has undergone   nutritional and psychological teaching at this time period and does wish to proceed   with sleeve gastrectomy. OPERATIVE PROCEDURE: The patient was brought to the operating room, placed   on the table in supine position at which time general  anesthesia was administered   without any difficulty. The abdomen was then prepped and draped in the   usual sterile fashion. Using a 15 blade, a 1 cm incision was made just to the   left of the umbilicus. The veress needle approach was used to gain access to   the peritoneal cavity which was then insufflated.  The Visi-Port was then placed   at that site,then 4 additional trocars were placed in the usual U-shaped   configuration with a subxiphoid incision being made to accommodate the   [facility] retractor. On entering the abdomen, the patient was noted to have a   massively fatty liver with possible evidence of early steatohepatitis. I elevated the liver and noted the   patient had no diaphragmatic hernia present. I began the operation by choosing an area 2-3 cm   proximal to the pylorus and within the gastroepiploic vessels I began to divide off these vessels individually. I moved cephalad toward short gastric vessels, which were very difficult to take down due to the proximity   to the splenic hilum. I was able to do so, clearing the entire left crural area. I then placed a Visigi tube,   impacting at the distal antrum. I then began the resection with the powered Timpson   stapler using the black loads with Endopath buttress strips for the first firing tangential along the   antral region. The second and subsequent firings were used with green and black  reloads and the same buttress strips reaching just past the incisura region. The remainder of the 5 vertical   firings completed the resection at the left crural region. I then tested   the pouch via the Visigi tube using dilute methylene blue,it was noted to be completely   Watertight. At this juncture due to some proximal gastric wall thickening near the apex of the sleeve,   I chose to oversew the staple line. I obtained a 2-0 Ethibond suture and oversewed the staple line from the   apex to the mid body of the sleeve. This portion of the procedure took quite some time to perform as I began at   the apex of the staple line and oversewed it all the way down to the incisura region. The total time spent   performing this oversew was in excess of 25 minutes duration.   We did perform this due to the thickness   of the gastric wall and the realization that oversewing staple lines almost certainly reduces the leak rate in   an area of high risk region of the stomach. This went without event. I then left the operative field   and proceeded to the the head of the bed and performed an   intraoperative EGD. The scope was passed successfully into the gastric sleeve to the level of the pylorus. Biopsies were taken of this region and submitted to test both for H Pylori and for pathologic diagnosis. There was no bleeding noted and no leak appreciated with air insufflation. I then returned to the surgical field. I then obtained hemostasis along the staple line using   Hemoclips and sutures where needed. I then used 3 separate 2-0 Ethibond sutures to secure the lateral   aspect of the newly created sleeve stomach to the resected edge of the gastrocolic omentum in an effort   to maintain the continuity of the sleeve and prevent twisting. I then used Eviseal   along the entire staple line to obtain hemostasis and then place Surgicel Snow over the staple line also. With all this having been completed, I then removed the liver retractor. I performed a liver wedge biopsy of   the left lobe of the liver due to its abnormality and submitted to pathology for permanent section. I then placed   a LUIS drain in the left upper quadrant region along the staple line. I removed the specimen from the operative   field via the LLQ incision. I closed the left lower quadrant trocar site using a transabdominal #0 PDS   suture along the fascia, and all skin incisions were then closed using 4-0 subcuticular Monocryl. Steri-Strips   and sterile dressings were applied. The patient tolerated the procedure well.        Roderick Luciano M.D.

## 2022-07-08 ENCOUNTER — APPOINTMENT (OUTPATIENT)
Dept: GENERAL RADIOLOGY | Age: 32
DRG: 403 | End: 2022-07-08
Attending: SPECIALIST
Payer: MEDICAID

## 2022-07-08 VITALS
BODY MASS INDEX: 42.66 KG/M2 | DIASTOLIC BLOOD PRESSURE: 97 MMHG | SYSTOLIC BLOOD PRESSURE: 154 MMHG | RESPIRATION RATE: 18 BRPM | HEIGHT: 72 IN | WEIGHT: 315 LBS | HEART RATE: 89 BPM | OXYGEN SATURATION: 95 % | TEMPERATURE: 98.5 F

## 2022-07-08 LAB
GLUCOSE BLD STRIP.AUTO-MCNC: 106 MG/DL (ref 70–110)
GLUCOSE BLD STRIP.AUTO-MCNC: 92 MG/DL (ref 70–110)

## 2022-07-08 PROCEDURE — 82962 GLUCOSE BLOOD TEST: CPT

## 2022-07-08 PROCEDURE — 74011250637 HC RX REV CODE- 250/637: Performed by: SPECIALIST

## 2022-07-08 PROCEDURE — C9113 INJ PANTOPRAZOLE SODIUM, VIA: HCPCS | Performed by: SPECIALIST

## 2022-07-08 PROCEDURE — 74011000250 HC RX REV CODE- 250: Performed by: SPECIALIST

## 2022-07-08 PROCEDURE — 74011250636 HC RX REV CODE- 250/636: Performed by: SPECIALIST

## 2022-07-08 PROCEDURE — 74011000636 HC RX REV CODE- 636: Performed by: SPECIALIST

## 2022-07-08 PROCEDURE — 74240 X-RAY XM UPR GI TRC 1CNTRST: CPT

## 2022-07-08 RX ORDER — AMLODIPINE BESYLATE 5 MG/1
10 TABLET ORAL DAILY
Status: DISCONTINUED | OUTPATIENT
Start: 2022-07-08 | End: 2022-07-08 | Stop reason: HOSPADM

## 2022-07-08 RX ORDER — LOSARTAN POTASSIUM 50 MG/1
50 TABLET ORAL DAILY
Status: DISCONTINUED | OUTPATIENT
Start: 2022-07-08 | End: 2022-07-08 | Stop reason: HOSPADM

## 2022-07-08 RX ORDER — OXYCODONE AND ACETAMINOPHEN 5; 325 MG/1; MG/1
1-2 TABLET ORAL
Status: DISCONTINUED | OUTPATIENT
Start: 2022-07-08 | End: 2022-07-08 | Stop reason: HOSPADM

## 2022-07-08 RX ADMIN — SODIUM CHLORIDE 40 MG: 9 INJECTION, SOLUTION INTRAMUSCULAR; INTRAVENOUS; SUBCUTANEOUS at 08:23

## 2022-07-08 RX ADMIN — SODIUM CHLORIDE, POTASSIUM CHLORIDE, SODIUM LACTATE AND CALCIUM CHLORIDE 150 ML/HR: 600; 310; 30; 20 INJECTION, SOLUTION INTRAVENOUS at 06:31

## 2022-07-08 RX ADMIN — OXYCODONE HYDROCHLORIDE AND ACETAMINOPHEN 1 TABLET: 5; 325 TABLET ORAL at 10:08

## 2022-07-08 RX ADMIN — METOCLOPRAMIDE 10 MG: 5 INJECTION, SOLUTION INTRAMUSCULAR; INTRAVENOUS at 05:30

## 2022-07-08 RX ADMIN — SODIUM CHLORIDE, POTASSIUM CHLORIDE, SODIUM LACTATE AND CALCIUM CHLORIDE 150 ML/HR: 600; 310; 30; 20 INJECTION, SOLUTION INTRAVENOUS at 00:30

## 2022-07-08 RX ADMIN — LOSARTAN POTASSIUM 50 MG: 50 TABLET, FILM COATED ORAL at 11:11

## 2022-07-08 RX ADMIN — ENOXAPARIN SODIUM 40 MG: 100 INJECTION SUBCUTANEOUS at 05:29

## 2022-07-08 RX ADMIN — METOCLOPRAMIDE 10 MG: 5 INJECTION, SOLUTION INTRAMUSCULAR; INTRAVENOUS at 00:28

## 2022-07-08 RX ADMIN — KETOROLAC TROMETHAMINE 30 MG: 30 INJECTION, SOLUTION INTRAMUSCULAR at 00:28

## 2022-07-08 RX ADMIN — KETOROLAC TROMETHAMINE 30 MG: 30 INJECTION, SOLUTION INTRAMUSCULAR at 05:30

## 2022-07-08 RX ADMIN — DIATRIZOATE MEGLUMINE AND DIATRIZOATE SODIUM 40 ML: 660; 100 LIQUID ORAL; RECTAL at 08:48

## 2022-07-08 RX ADMIN — AMLODIPINE BESYLATE 10 MG: 5 TABLET ORAL at 11:11

## 2022-07-08 NOTE — DISCHARGE INSTRUCTIONS
[facility]  Ирина Gee. Juaquin Hoffmann M.D., F.A.C.S.  [address] [place] [address] Drive. [address] [place] [address] Rd, [address] [facility]  Office: [phone]    Fax:  [phone]    Discharge Instruction for Gastric Bypass / Sleeve Gastrectomy Patients    Diet:   Continue with the liquid diet until you are seen in the office. Make sure you sip fluids all day. Aim for  Gms of protein every day. Activity:   Walking is encouraged. Rest when you are tired.  You may shower.  You may climb stairs. Take your time.  No lifting more than 10-15 lbs.  If you feel discomfort during an activity, rest.   Do not drive for 1 week. Wound Care:   Clean incisions with soap and water when in the shower. Pat dry.  Leave steri-strips on until they fall off.  A small amount of drainage may be present from the incisions. Contact the office if you notice an increase in drainage, an odor, increased redness, or fever > 100.5. Medications:   You will receive a prescription for pain medication at the time of discharge.  For upset stomach you may take over the counter medications such as Maalox, Mylanta, Pepcid, Zantac, or Tagamet.  You may take Tylenol   Non-aspirin based arthritis medications may be resumed after the first month.  Take a childrens or adult chewable multivitamin.  Milk of Magnesia will help with constipation.  It is fine to take your usual home medications. Blood pressure medications should be continued after surgery. Diabetic medications can frequently be reduced very quickly after surgery. Diabetics should continue to monitor blood sugars frequently after surgery and contact the prescribing physician for any questions. Follow-Up Appointment:   If you do not already have a follow-up appointment scheduled, contact the office in the next few days to obtain one. It is usually scheduled for 10-14 days after you surgery date. Office phone number:  [phone].         REV  09/2010

## 2022-07-08 NOTE — NURSE NAVIGATOR
Patient sipping water and tolerating well. Vitals:   Blood pressure (!) 156/87, pulse 96, temperature 98.9 °F (37.2 °C), resp. rate 20, height 6' (1.829 m), weight (!) 206.6 kg (455 lb 6.4 oz), SpO2 97 %. Patient instructed that RN will remove Catapress patch prior to discharge. Jenni RN, made aware and verbalized understanding. Output: reviewed, and patient verified urinating clear, yellow urine. Pulmonary: Clear in all lobes  Cardiac: Regular rate and rhythm  Abdomen: Bowel sounds hypo-active x4. Lap sites without erythema, swelling,  and/or drainage. LUIS drain removed; dressing clean, dry, and intact. SCD's: Positioned and operating WNL    Patient with expected pain, but is being managed and is currently tolerable. No nausea and/or vomiting. Patient has been ambulating the halls. Patient has not had the opportunity to swallow pills. Post-op diet progression discussed with patient. Patient to be discharged on a bariatric full liquid diet. Patient verbalized understanding of liquid diet for next two weeks until first post-op visit. Goal of four ounces per hour with one ounce being protein was clearly understood. Medications were discussed (i.e., pain- Percocet, Tylenol, not to use aspirin or ibuprofen based products, as well as steroids). Constipation was discussed and ways to alleviate it were discussed. Education completed on IS use and to ambulate every hour when at home. Patient completed a return demonstration on IS with no issues or concerns from this RN. Lovenox and Percocet filled and in the home. Lovenox and Percocet education provided, and patient verbalized understanding. Patient has chewable multi-vitamin, probiotic, and Prilosec in the home; they were reviewed. Ketosis was also reviewed. Patient given a report card to record intake and a handout to support bedside education.   All questions were answered by this RN, and patient verbalized understanding to all education provided. Goals for discharge were discussed, and patient verbalized understanding. Post-op follow-up appt. [place] scheduled.

## 2022-07-08 NOTE — PROGRESS NOTES
Bariatric Surgery                POD #1    Visit Vitals  BP (!) 156/87   Pulse 96   Temp 98.9 °F (37.2 °C)   Resp 20   Ht 6' (1.829 m)   Wt (!) 206.6 kg (455 lb 6.4 oz)   SpO2 97%   BMI 61.76 kg/m²     Patient has minimal complaints of pain, minimal nausea noted     Exam:  Appears well in no distress  Lungs- clear bilaterally  Abd - soft, incisions look good without erythema           LUIS with minimal serosanguinous output  Extremities- no new edema or swelling    UGI - pending    Data Review:    Labs: Results:       Chemistry No results for input(s): GLU, NA, K, CL, CO2, BUN, CREA, CA, AGAP, BUCR, TBIL, AP, TP, ALB, GLOB, AGRAT in the last 72 hours. No lab exists for component: GPT   CBC w/Diff No results for input(s): WBC, RBC, HGB, HCT, PLT, GRANS, LYMPH, EOS, RETIC, HGBEXT, HCTEXT, PLTEXT in the last 72 hours. Coagulation No results for input(s): PTP, INR, APTT, INREXT in the last 72 hours. Liver Enzymes No results for input(s): TP, ALB, TBIL, AP in the last 72 hours. No lab exists for component: SGOT, GPT, DBIL       Assessment/Plan: S/P  laparoscopic sleeve gastrectomy - doing well without any issues    Following orders after UGI confirmed normal -     1. Start bariatric diet and protein shakes  2. D/C IV pain meds and start PO pain meds  3. D/C LUIS drain  4. Likley PM D/C if  Cont ok and tolerate PO

## 2022-07-08 NOTE — PROGRESS NOTES
26 - Bedside report received from Mireille, [facility]. Patient in bed. Pain 2/10. BP and pulse have been high, team aware per report. 1958 - Patient in bed at this time. IV to R FA  intact and patent. Sequential compression device bilaterally. TEDs to BLE. Dressings to abd lap sites CDI. LUIS with scant amnts of brownish drge. + CMS. Pt A & O x 4. LS clear, on RA. Abdomen soft, tender and ND. + BS to all 4 quadrants. Denies nausea. Pain 2/10. Call light within reach. 0430-Pt denies need for pain meds, said the 0600 Toradol will be enough. Pt had an uneventful shift. Uses IS every hour while awake. Pt ambulated without any issues. Pain remained well-controlled with the ordered medication. No issues/concerns at this time.  Call bell within reach

## 2022-07-08 NOTE — ROUTINE PROCESS
Bedside and Verbal shift change report given to [facility], RN by Rocio Brock. Report included the following information SBAR, Kardex, OR Summary, Intake/Output and MAR.
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
## 2022-07-08 NOTE — PROGRESS NOTES
conducted a Follow up consultation and Spiritual Assessment for Jennifer Rodriguez, who is a 33 y.o.,male. The  provided the following Interventions:  Continued the relationship of care and support. Listened empathically. Offered assurance of continued prayer on patients behalf. Chart reviewed. The following outcomes were achieved:  Patient expressed gratitude for pastoral care visit. Assessment:  There are no further spiritual or Alevism issues which require Spiritual Care Services interventions at this time. Plan:  Chaplains will continue to follow and will provide pastoral care on an as needed/requested basis.  recommends bedside caregivers page  on duty if patient shows signs of acute spiritual or emotional distress.      Sister Francine Garcia, [place], [address] [phone]

## 2022-07-08 NOTE — PROGRESS NOTES
Verbal and Bedside change of shift report given to Toño Arriaga RN by Marsha Marie RN. Opportunity for questions were provided. 0730  Assumed care of patient. Assessment complete at this time. Pt alert and oriented x 4. Lungs clear bilaterally on room air. Patient denies shortness of breath/chest pain. No numbness and tingling in all extremities. 18G IV to R. Forearm dressing clean, dry, & intact. Stated pain 2/10. SCD's/ Jesus hose in place. Steri-strip dressing to abdomen over 5 trochar sites that is clean, dry, & intact. Incentive spirometer at bedside. Patient taught how to use breathing tool w/ proper demonstration. Instructed to use 10x Q1. Verbalized understanding. Last BM 07/06/2022. Patient oriented to room and call bell. Call bell within reach. Bed in lowest position. 4341  Patient off the floor to Upper GI scan. [address] drain removed. Patient tolerated well. Covered w/ gauze and tape. educated to inform nurse if any drainage happens. 1008  PRN 5-325 mg Oral Percocet given for 6/10 pain    1119  IV removed due to infiltration. Discharge instructions reviewed w/ patient and family. Verbal understanding of instructions. Opportunity for questions were provided.

## 2022-07-11 ENCOUNTER — TELEPHONE (OUTPATIENT)
Dept: SURGERY | Age: 32
End: 2022-07-11

## 2022-07-11 NOTE — TELEPHONE ENCOUNTER
This RN spoke with patient post-operatively. Hydration: Patient is not maintianing a hydration log. He hydrated with 34 ounces as of yesterday. Thus far today, he has had 28 ounces as of 0630 this morning. RN encouraged him to maintain a log so RN can better assess patient's hydration status. He verbalized understanding. Nausea and/or vomitting: None    Pain: Currently managed with Percocet at night     Lovenox injections: Administering every 12 hours, rotating sites. RN reminded patient to complete all injections, in which patient verbalized understanding. Lap sites: No erythema, drainage, and/or swelling    LUIS drain site: No drainage; dressing removed    BM: One since surgery    Ambulation: Patient is walking throughout house every hour. IS: Patient continues to use 10x's per hour while awake. He reached 2,500 mL     Temperature: 98.3 degrees    Medications: (confirmed currently taking)   *Multi-vitamin: yes   *Probiotic: yes   *Prilosec: yes    Questions: None    This RN reminded the patient to contact the office with any questions and/or concerns. RN also reminded patient they will receive another follow-up TC prior to the two week post-op follow-up appointment. Patient verbalized understanding to both. Patient's two week post-op visit is scheduled and was confirmed.

## 2022-07-11 NOTE — TELEPHONE ENCOUNTER
RN attempted to speak with patient in follow-up to discharge; however, his VM was full and unable to leave a message. In turn, RN e-mailed patient requesting a return call.

## 2022-07-15 ENCOUNTER — TELEPHONE (OUTPATIENT)
Dept: SURGERY | Age: 32
End: 2022-07-15

## 2022-07-15 NOTE — TELEPHONE ENCOUNTER
This RN spoke with patient post-operatively. Hydration: Patient is getting tired of the full liquid diet. RN re-educated him on the importance of adhering to the full liquid diet until his two week check-up, as failure to follow it can be life threatening. He verbalized understanding. Yesterday, he hydrated with a total of 59 ounces. He is complaining of gassiness, and after chatting, RN learned he is hydrating using straws. RN re-educated him on the importance of not using straws for one year. He verbalized understanding. He continues to not use a hydration log. Again, RN re-educated him on the importance of maintaining one each day to better care for him      Nausea and/or vomitting: Some nausea, but when he hydrates with a large amount. Pain: Currently managed without medicaiton    Lovenox injections: Administering every 12 hours, rotating sites. RN reminded patient to complete all injections, in which patient verbalized understanding. Lap sites: No erythema, drainage, and/or swelling    LUIS drain site: No drainage; dressing removed    BM: One since surgery. Patient will begin taking Miralax. Ambulation: Patient is walking throughout house every hour. IS: Patient continues to use 10x's per hour while awake. He reached 2,500 mL. Temperature: 98 degrees    Medications: (confirmed currently taking)   *Multi-vitamin: yes   *Probiotic: yes   *Prilosec: yes    Questions: None    This RN reminded the patient to contact the office with any questions and/or concerns. Patient verbalized understanding. Patient's two week post-op visit is scheduled and was confirmed.

## 2022-07-19 ENCOUNTER — HOSPITAL ENCOUNTER (OUTPATIENT)
Dept: BARIATRICS/WEIGHT MGMT | Age: 32
Discharge: HOME | End: 2022-07-19

## 2022-07-19 NOTE — PROGRESS NOTES
Spoke with Carlotta Andre  today. Pt is approx. 2 weeks S/P laparoscopic sleeve gastrectomy procedure. Tolerating liquid diet very well. Protein shake intake: at least 2 Premier protein supplement shakes/day. Fluid intake: 64 oz/day. Foods being consumed include yogurt, sf jello, sf pudding, sf popsicles, broth, and low fat cream of mushroom (strained & diluted). No complaints. Wt: 430 lbs lbs, \"a couple days ago\"  Pre-op Wt: 453 lbs    EBW: 269  lbs   9 % EBWL    Activity: walking around block \"a couple loops\", every other day OR up and down stairs on off days. Supplements:  [x] [place]'s Complete Chewable MVI BID  [x] Probiotic QD  [x]Prilosec QD    Pt given one on one diet education over the phone. Reviewed diet progression for weeks 3-4. Patient appears to have a good understanding of the diet progression, food choices, and dietary/exercise habits for successful weight loss and nourishment after surgery. Reviewed pp. 32-45 of the patient education book. Discussed: post-op diet progression, including soft/pureed high protein, low fat, low sugar food recommendations; proper food group choices;  consumption of food and liquids, and consumption of adequate no-sugar, no-caffeine, no carbonation liquids. We reviewed appropriate food choices, meal adaptations (use of smaller dishes/utensils, eat slowly and chewing sufficiently for digestion), cooking techniques, and eating behavior modifications. Discussed intake regimen with 3 meals and 2-3 protein supplements per day. Additionally, intake timing - to include consuming a meal or snack every 3-4 hrs, the 30:30 rule, and having a meal within 2 hours of waking . Reinforced the importance of continued vitamin & probiotic consumption, adequate fluid with goal of 64 oz per day and adequate protein with goal of  grams per day.  Stressed the importance of 30 min of physical activity each day, as tolerated, with restricted lifting, to improve post op weight loss results and bowel function. Pt voiced understanding through repeating the information back to me. All pt nutrition-related questions answered. Reminded pt of their appointment for their 2 week post-op medical evaluation  on 7/20/2022   at 2:20 pm .  Additionally reminded pt that a RD would be calling them again at their 1 mo. post-op willard. Pt provided with RD contact information for nutritional questions or concerns between now and then.     RD: Charlee Parsons MS, RD/LD

## 2022-07-20 ENCOUNTER — TELEPHONE (OUTPATIENT)
Dept: SURGERY | Age: 32
End: 2022-07-20

## 2022-07-20 ENCOUNTER — OFFICE VISIT (OUTPATIENT)
Dept: SURGERY | Age: 32
End: 2022-07-20
Payer: MEDICAID

## 2022-07-20 VITALS
WEIGHT: 315 LBS | OXYGEN SATURATION: 100 % | TEMPERATURE: 96.8 F | SYSTOLIC BLOOD PRESSURE: 133 MMHG | BODY MASS INDEX: 42.66 KG/M2 | HEIGHT: 72 IN | HEART RATE: 87 BPM | DIASTOLIC BLOOD PRESSURE: 83 MMHG

## 2022-07-20 DIAGNOSIS — Z09 POSTOPERATIVE EXAMINATION: Primary | ICD-10-CM

## 2022-07-20 PROBLEM — Z98.84 S/P LAPAROSCOPIC SLEEVE GASTRECTOMY: Status: ACTIVE | Noted: 2022-07-20

## 2022-07-20 PROBLEM — K90.9 INTESTINAL MALABSORPTION: Status: ACTIVE | Noted: 2022-07-20

## 2022-07-20 PROCEDURE — 99024 POSTOP FOLLOW-UP VISIT: CPT | Performed by: NURSE PRACTITIONER

## 2022-07-20 RX ORDER — CHOLECALCIFEROL (VITAMIN D3) 50 MCG
CAPSULE ORAL
COMMUNITY
End: 2022-10-11 | Stop reason: ALTCHOICE

## 2022-07-20 RX ORDER — OMEPRAZOLE 20 MG/1
20 CAPSULE, DELAYED RELEASE ORAL DAILY
COMMUNITY

## 2022-07-20 RX ORDER — URSODIOL 200 MG/1
200 CAPSULE ORAL 3 TIMES DAILY
Qty: 90 EACH | Refills: 4 | Status: SHIPPED | OUTPATIENT
Start: 2022-07-20
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
## 2022-08-04 ENCOUNTER — HOSPITAL ENCOUNTER (EMERGENCY)
Age: 32
Discharge: HOME | End: 2022-08-04
Attending: EMERGENCY MEDICINE
Payer: MEDICAID

## 2022-08-04 VITALS
OXYGEN SATURATION: 100 % | HEIGHT: 72 IN | DIASTOLIC BLOOD PRESSURE: 99 MMHG | RESPIRATION RATE: 16 BRPM | HEART RATE: 92 BPM | BODY MASS INDEX: 42.66 KG/M2 | SYSTOLIC BLOOD PRESSURE: 166 MMHG | TEMPERATURE: 97.9 F | WEIGHT: 315 LBS

## 2022-08-04 DIAGNOSIS — E86.0 DEHYDRATION: ICD-10-CM

## 2022-08-04 DIAGNOSIS — R42 POSITIONAL LIGHTHEADEDNESS: Primary | ICD-10-CM

## 2022-08-04 DIAGNOSIS — Z90.3 H/O GASTRIC SLEEVE: ICD-10-CM

## 2022-08-04 LAB
ALBUMIN SERPL-MCNC: 3.4 G/DL (ref 3.4–5)
ALBUMIN/GLOB SERPL: 0.6 {RATIO} (ref 0.8–1.7)
ALP SERPL-CCNC: 55 U/L (ref 45–117)
ALT SERPL-CCNC: 26 U/L (ref 16–61)
ANION GAP SERPL CALC-SCNC: 7 MMOL/L (ref 3–18)
APPEARANCE UR: ABNORMAL
AST SERPL-CCNC: 19 U/L (ref 10–38)
BACTERIA URNS QL MICRO: ABNORMAL /HPF
BASOPHILS # BLD: 0 K/UL (ref 0–0.1)
BASOPHILS NFR BLD: 1 % (ref 0–2)
BILIRUB SERPL-MCNC: 0.4 MG/DL (ref 0.2–1)
BILIRUB UR QL: ABNORMAL
BUN SERPL-MCNC: 10 MG/DL (ref 7–18)
BUN/CREAT SERPL: 8 (ref 12–20)
CALCIUM SERPL-MCNC: 9.5 MG/DL (ref 8.5–10.1)
CAOX CRY URNS QL MICRO: ABNORMAL
CHLORIDE SERPL-SCNC: 104 MMOL/L (ref 100–111)
CO2 SERPL-SCNC: 26 MMOL/L (ref 21–32)
COLOR UR: ABNORMAL
CREAT SERPL-MCNC: 1.29 MG/DL (ref 0.6–1.3)
DIFFERENTIAL METHOD BLD: ABNORMAL
EOSINOPHIL # BLD: 0.2 K/UL (ref 0–0.4)
EOSINOPHIL NFR BLD: 3 % (ref 0–5)
EPITH CASTS URNS QL MICRO: ABNORMAL /LPF (ref 0–5)
ERYTHROCYTE [DISTWIDTH] IN BLOOD BY AUTOMATED COUNT: 17.2 % (ref 11.6–14.5)
GLOBULIN SER CALC-MCNC: 5.3 G/DL (ref 2–4)
GLUCOSE SERPL-MCNC: 92 MG/DL (ref 74–99)
GLUCOSE UR STRIP.AUTO-MCNC: NEGATIVE MG/DL
HCT VFR BLD AUTO: 46.1 % (ref 36–48)
HGB BLD-MCNC: 14.2 G/DL (ref 13–16)
HGB UR QL STRIP: NEGATIVE
HYALINE CASTS URNS QL MICRO: ABNORMAL /LPF (ref 0–2)
IMM GRANULOCYTES # BLD AUTO: 0 K/UL (ref 0–0.04)
IMM GRANULOCYTES NFR BLD AUTO: 0 % (ref 0–0.5)
KETONES UR QL STRIP.AUTO: 40 MG/DL
LEUKOCYTE ESTERASE UR QL STRIP.AUTO: NEGATIVE
LYMPHOCYTES # BLD: 1.9 K/UL (ref 0.9–3.6)
LYMPHOCYTES NFR BLD: 36 % (ref 21–52)
MCH RBC QN AUTO: 25.6 PG (ref 24–34)
MCHC RBC AUTO-ENTMCNC: 30.8 G/DL (ref 31–37)
MCV RBC AUTO: 83.2 FL (ref 78–100)
MONOCYTES # BLD: 0.9 K/UL (ref 0.05–1.2)
MONOCYTES NFR BLD: 17 % (ref 3–10)
MUCOUS THREADS URNS QL MICRO: ABNORMAL /LPF
NEUTS SEG # BLD: 2.3 K/UL (ref 1.8–8)
NEUTS SEG NFR BLD: 43 % (ref 40–73)
NITRITE UR QL STRIP.AUTO: NEGATIVE
NRBC # BLD: 0 K/UL (ref 0–0.01)
NRBC BLD-RTO: 0 PER 100 WBC
PH UR STRIP: 6 [PH] (ref 5–8)
PLATELET # BLD AUTO: 160 K/UL (ref 135–420)
PMV BLD AUTO: 11.6 FL (ref 9.2–11.8)
POTASSIUM SERPL-SCNC: 4 MMOL/L (ref 3.5–5.5)
PROT SERPL-MCNC: 8.7 G/DL (ref 6.4–8.2)
PROT UR STRIP-MCNC: 100 MG/DL
RBC # BLD AUTO: 5.54 M/UL (ref 4.35–5.65)
RBC #/AREA URNS HPF: ABNORMAL /HPF (ref 0–5)
SODIUM SERPL-SCNC: 137 MMOL/L (ref 136–145)
SP GR UR REFRACTOMETRY: >1.03 (ref 1–1.03)
TROPONIN-HIGH SENSITIVITY: 5 NG/L (ref 0–78)
UROBILINOGEN UR QL STRIP.AUTO: 1 EU/DL (ref 0.2–1)
WBC # BLD AUTO: 5.3 K/UL (ref 4.6–13.2)
WBC URNS QL MICRO: ABNORMAL /HPF (ref 0–5)

## 2022-08-04 PROCEDURE — 99284 EMERGENCY DEPT VISIT MOD MDM: CPT

## 2022-08-04 PROCEDURE — 93005 ELECTROCARDIOGRAM TRACING: CPT

## 2022-08-04 PROCEDURE — 96360 HYDRATION IV INFUSION INIT: CPT

## 2022-08-04 PROCEDURE — 84484 ASSAY OF TROPONIN QUANT: CPT

## 2022-08-04 PROCEDURE — 80053 COMPREHEN METABOLIC PANEL: CPT

## 2022-08-04 PROCEDURE — 85025 COMPLETE CBC W/AUTO DIFF WBC: CPT

## 2022-08-04 PROCEDURE — 81001 URINALYSIS AUTO W/SCOPE: CPT

## 2022-08-04 PROCEDURE — 74011250636 HC RX REV CODE- 250/636: Performed by: PHYSICIAN ASSISTANT

## 2022-08-04 RX ADMIN — SODIUM CHLORIDE 1000 ML: 9 INJECTION, SOLUTION INTRAVENOUS at 21:18

## 2022-08-05 NOTE — ED TRIAGE NOTES
Patient complains of nausea and lightheadedness intermittent since his gastric sleeve X 1 month ago.

## 2022-08-05 NOTE — ED NOTES
Pt arrives alert oriented ambulatory c/o feeling lightheaded after moving, mostly after bending. Pt had a gastric sleeve surgery in 07/07/2022 symptoms since. Denies shortness of breath chest pains other complaints. Pt has patent airway no objective distress.
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
## 2022-08-06 LAB
ATRIAL RATE: 94 BPM
CALCULATED P AXIS, ECG09: 44 DEGREES
CALCULATED R AXIS, ECG10: 28 DEGREES
CALCULATED T AXIS, ECG11: 27 DEGREES
DIAGNOSIS, 93000: NORMAL
P-R INTERVAL, ECG05: 184 MS
Q-T INTERVAL, ECG07: 346 MS
QRS DURATION, ECG06: 92 MS
QTC CALCULATION (BEZET), ECG08: 432 MS
VENTRICULAR RATE, ECG03: 94 BPM

## 2022-08-08 ENCOUNTER — HOSPITAL ENCOUNTER (OUTPATIENT)
Dept: BARIATRICS/WEIGHT MGMT | Age: 32
Discharge: HOME | End: 2022-08-08

## 2022-08-09 ENCOUNTER — TELEPHONE (OUTPATIENT)
Dept: BARIATRICS/WEIGHT MGMT | Age: 32
End: 2022-08-09

## 2022-08-09 VITALS — WEIGHT: 315 LBS | BODY MASS INDEX: 42.66 KG/M2 | HEIGHT: 72 IN

## 2022-08-09 NOTE — PROGRESS NOTES
Patient is a 27 y.o. male approx. 1 mo S/P laparoscopic sleeve gastrectomy. Visit Vitals  Ht 6' (1.829 m)   Wt (!) 185.5 kg (409 lb)   BMI 55.47 kg/m²     Pt current weight obtained from ER visit on 8/5/22; visit was virtual.     Pre-op Wt: 453 lbs  EBW: 269 lbs    Pt has lost 44 lbs since surgery on 7/7/22. Pt has lost 16% EBW. Pt is currently on a soft food diet without difficulty. Patient is hydrating with water, [employer]life protein shakes (1x/d), Minute Maid Zero Sugar -  128 ounces, daily since going to the ER. Patient is tolerating the following sources of protein: lean beef sausage crumbles, egg/cheese/green pepper/sausage omelet, fish, chicken, tuna fish, shrimp (2 large), crab cake (1/2 over 3 hrs). Also eating cauliflower rice, hot fries (1), [employer] protein chips, nut mix w/ dark cocoa, pork rinds. Frequent snacking reported. Reports eating  3 meals/d, portion sizes are approximately 3 oz, and meals take approximately 60 min to complete. Patient is currently walking for exercise, <20 min/day , 5x/wk. Patient [x] has  [] has not had any readmissions, reoperations, complications, ED visits (dehydration, 8/5/22), nor IVF at [facility] during her first post-op month. Pt [x]is []is not taking her Prilosec. Supplements:  [x] Flint[employer] Complete MVI BID  [x] Probiotic    Reviewed the following with patient:  Advance diet to solid phase. Reminded to measure portions, continue high protein, low carbohydrate diet. Reminded to eat regularly, to eat slowly & not to drink with meals. Refer to the handbook and video. Continue multi-vitamin with iron and add the following supplements  (as listed in handbook):  Calcium citrate: 1,500 mg/d, taken in doses of 500 mg TID, 2 hours apart from MVI doses  Vitamin B-complex: one a day  Vitamin B12: 1,000 mcg daily taken sublingually  Vitamin D3: 3,000 IU per day  Can stop probiotic, if desired, or needed for economical reasons.   Continue cardio exercise and add resistance exercises. Discussed with patient. Minimum of 30 minutes of exercise daily, five days a week. Encouraged to attend monthly support group. Start 500mg Ursodiol (Reltone) today, stop after 6 months out from surgery. Continue current medications and follow up with PCP for management of regimen. I have discussed this plan with patient, and they verbalized understanding. One-month nutrition video to include the above mentioned education and link for support group e-mailed to patient. RD contact information provided for nutrition-related questions. Follow-up with provider at three-month appointment or sooner if patient has questions, concerns, or worsening of condition. If unable to reach our office, patient should report to the ED.      Nelly Ortiz RD

## 2022-08-09 NOTE — TELEPHONE ENCOUNTER
8/9/2022:  Contacted patient today at 3:15 PM  in reference to: 1-mo. phone call to assess post-op diet tolerance and discuss diet progression. Patient was left a voicemail instructing them to return call. My contact information was provided.     Parish Ware RD

## 2022-08-22 ENCOUNTER — TELEPHONE (OUTPATIENT)
Dept: SURGERY | Age: 32
End: 2022-08-22

## 2022-08-22 NOTE — TELEPHONE ENCOUNTER
called c/o stomach pain and feels like he needs to burp. When he tries to burp it makes him feel as though he is going to vomit x 1 month. He said he started to really notice when he started adding new foods to his diet. He does get relief from gas x. Per Amari Farias he should switch to a lactose free protein shake. Make sure he is taking his Prilosec every day . Continue Gaz-X every 4-6 hours and back off of green vegetables like broccoli,cucumbers etc  that can cause gas to be worse. Also told him to keep a food journal to help figure out what foods are making his symptoms worse. Told him to call us back if symptoms don't improve.

## 2022-09-05 RX ORDER — ENOXAPARIN SODIUM 100 MG/ML
INJECTION SUBCUTANEOUS
Qty: 1 EACH | Refills: 0 | Status: SHIPPED | OUTPATIENT
Start: 2022-09-05 | End: 2022-09-08 | Stop reason: ALTCHOICE

## 2022-09-05 RX ORDER — ONDANSETRON 8 MG/1
TABLET, ORALLY DISINTEGRATING ORAL
Qty: 30 TABLET | Refills: 0 | Status: SHIPPED | OUTPATIENT
Start: 2022-09-05

## 2022-09-08 ENCOUNTER — OFFICE VISIT (OUTPATIENT)
Dept: SURGERY | Age: 32
End: 2022-09-08
Payer: MEDICAID

## 2022-09-08 ENCOUNTER — HOSPITAL ENCOUNTER (OUTPATIENT)
Dept: LAB | Age: 32
Discharge: HOME | End: 2022-09-08

## 2022-09-08 VITALS
OXYGEN SATURATION: 95 % | HEART RATE: 84 BPM | DIASTOLIC BLOOD PRESSURE: 72 MMHG | TEMPERATURE: 96.9 F | BODY MASS INDEX: 42.66 KG/M2 | HEIGHT: 72 IN | SYSTOLIC BLOOD PRESSURE: 130 MMHG | WEIGHT: 315 LBS

## 2022-09-08 DIAGNOSIS — K90.9 INTESTINAL MALABSORPTION, UNSPECIFIED TYPE: Primary | ICD-10-CM

## 2022-09-08 DIAGNOSIS — Z98.84 S/P LAPAROSCOPIC SLEEVE GASTRECTOMY: ICD-10-CM

## 2022-09-08 PROBLEM — E66.01 MORBID OBESITY WITH BMI OF 60.0-69.9, ADULT (HCC): Status: RESOLVED | Noted: 2022-07-07 | Resolved: 2022-09-08

## 2022-09-08 LAB — XX-LABCORP SPECIMEN COL,LCBCF: NORMAL

## 2022-09-08 PROCEDURE — 99024 POSTOP FOLLOW-UP VISIT: CPT | Performed by: NURSE PRACTITIONER

## 2022-09-08 PROCEDURE — 99001 SPECIMEN HANDLING PT-LAB: CPT

## 2022-09-08 RX ORDER — LOSARTAN POTASSIUM 25 MG/1
TABLET ORAL
COMMUNITY
Start: 2022-09-05

## 2022-09-08 RX ORDER — METOCLOPRAMIDE 10 MG/1
10 TABLET ORAL
Qty: 40 TABLET | Refills: 0 | Status: SHIPPED | OUTPATIENT
Start: 2022-09-08 | End: 2022-09-18

## 2022-09-08 NOTE — PATIENT INSTRUCTIONS
Patient Instructions      Remember hydration goals - minimum of 64 ounces of liquids per day (dehydration is the number one reason for hospital readmission). Continue to monitor carbohydrate and protein intake you need a minimum of  Grams of protein daily- remember to keep your total carbohydrates to 50 grams or less per day for best results. Continue to work towards exercise goals - 60-90 minutes, 5 times a week minimum of deliberate, aerobic exercise is the ultimate goal with strength training 2 times each week. Refer to [employer] for  information. Remember to take vitamins as directed. Attend support group the 2nd Thursday of each month. 6.  Constipation: Milk of Magnesia is for immediate relief only. Miralax is to be used every day if constipation is a chronic problem. 7.  Diarrhea: patients will occasionally develop lactose intolerance after surgery. Check to see if your protein shake has whey in it. If it does try a protein powder or drink that does not have whey and stop all yogurts, cheeses and milks to see if the diarrhea goes away. 8.  If you have had labs drawn. We will only call you if you have abnormal results. Otherwise you can access the lab results in \"[email or web address]\". You will only need the access code the first time you sign on. 9.  Call us at [phone] or email us through [facility] with questions,     concerns or worsening of condition, we have someone on call 24 hours a day. If you are unable to reach our office, you are to go to your Primary Care Physician or the Emergency Department.      NOTE TO GASTRIC BYPASS PATIENTS:  (SAME APPLIES TO GASTRIC SLEEVE PATIENTS FOR FIRST TWO MONTHS)  Remember that for the rest of your life, you are not able to take the following:  - NSAIDs (ibuprofen, goody powder, BC powder, Motrin, Advil, Mobic, Voltaren, Excedrin, etc.)  - Steroid pills or injections  - Smoke (cigarettes or recreational drugs)  - Alcohol  Use of any of the above may cause ulcers in your stomach which may perforate causing a medical emergency and surgery. Speak to our medical staff if another medical provider requires you to take steroids or NSAIDs. Supplement Resource Guide    Importance of Protein:   Maintains lean body mass, produces antibodies to fight off infections, heals wounds, minimizes hair loss, helps to give you energy, helps with satiety, and keeping you full between meals. Importance of Calcium:  Needed for healthy bones and teeth, normal blood clotting, and nervous system functioning, higher risk of osteoporosis and bone disease with non-compliance. Importance of Multivitamins: Many functions. Supply you with extra nutrients that you may be missing from food. May lead to iron deficiency anemia, weakness, fatigue, and many other symptoms with non-compliance. Importance of B Vitamins:  Important for red blood cell formation, metabolism, energy, and helps to maintain a healthy nervous system. Protein Supplement  Find one you like now. Use immediately after surgery. Look for:  35-50g protein each day from your protein supplement once you reach the progression diet. 0-3 g fat per serving  0-3 g sugar per serving    Protein drinks should be split in separate dosages. Recommend: Lifelong  1 year + Calcium Supplement:     Start taking within a month after surgery. Look for: Calcium Citrate Plus D (1500 mg per day)  Recommend: Citracal     .            Avoid chocolate chewable calcium. Can use chewable bariatric or [employer] brand or similar chewable. The body cannot absorb more than 500-600 mg of calcium at a time. Take for Life Multi-vitamin Supplement:      Start immediately after surgery: any complete chewable, such as: [employer]s Complete chewables. Avoid [employer] sours or gummies.   They lack iron and other important nutrients and also have added sugar. Continue with chewable vitamin or change to adult complete multivitamin one month after surgery. Menstruating women can take a prenatal vitamin. Make sure has at least 18 mg iron and 260-142 mcg folic acid   Vitamin R62, B Complex Vitamin, and Biotin  Start taking within a month after surgery. Vitamin B12:  1000 mcg of Vitamin B12 three times weekly    Must take sublingually (meaning you take it under your tongue) or in a liquid drop form for easy absorption. B Complex Vitamin: Take a pill or liquid drop form once daily. Biotin: This vitamin can help prevent hair loss. Recommend 5mg   (5000 mcg) a day  Biotin is Optional           Learning About Being Physically Active  What is physical activity? Being physically active means doing any kind of activity that gets your body moving. The types of physical activity that can help you get fit and stay healthy include:  Aerobic or \"cardio\" activities. These make your heart beat faster and make you breathe harder, such as brisk walking, riding a bike, or running. They strengthen your heart and lungs and build up your endurance. Strength training activities. These make your muscles work against, or \"resist,\" something. Examples include lifting weights or doing push-ups. These activities help tone and strengthen your muscles and bones. Stretches. These let you move your joints and muscles through their full range of motion. Stretching helps you be more flexible. What are the benefits of being active? Being active is one of the best things you can do for your health. It helps you to:  Feel stronger and have more energy to do all the things you like to do. Focus better at school or work. Feel, think, and sleep better. Reach and stay at a healthy weight. Lose fat and build lean muscle. Lower your risk for serious health problems, including diabetes, heart attack, high blood pressure, and some cancers.   Keep your heart, lungs, bones, muscles, and joints strong and healthy. How can you make being active part of your life? Start slowly. Make it your long-term goal to get at least 30 minutes of exercise on most days of the week. Walking is a good choice. You also may want to do other activities, such as running, swimming, cycling, or playing tennis or team sports. Pick activities that you like--ones that make your heart beat faster, your muscles stronger, and your muscles and joints more flexible. If you find more than one thing you like doing, do them all. You don't have to do the same thing every day. Get your heart pumping every day. Any activity that makes your heart beat faster and keeps it at that rate for a while counts. Here are some great ways to get your heart beating faster:  Go for a brisk walk, run, or bike ride. Go for a hike or swim. Go in-line skating. Play a game of touch football, basketball, or soccer. Ride a bike. Play tennis or racquetball. Climb stairs. Even some household chores can be aerobic--just do them at a faster pace. Vacuuming, raking or mowing the lawn, sweeping the garage, and washing and waxing the car all can help get your heart rate up. Strengthen your muscles during the week. You don't have to lift heavy weights or grow big, bulky muscles to get stronger. Doing a few simple activities that make your muscles work against, or \"resist,\" something can help you get stronger. For example, you can:  Do push-ups or sit-ups, which use your own body weight as resistance. Lift weights or dumbbells or use stretch bands at home or in a gym or community center. Stretch your muscles often. Stretching will help you as you become more active. It can help you stay flexible, loosen tight muscles, and avoid injury. It can also help improve your balance and posture and can be a great way to relax. Be sure to stretch the muscles you'll be using when you work out.  It's best to warm your muscles slightly before you stretch them. Walk or do some other light aerobic activity for a few minutes, and then start stretching. When you stretch your muscles:  Do it slowly. Stretching is not about going fast or making sudden movements. Don't push or bounce during a stretch. Hold each stretch for at least 15 to 30 seconds, if you can. You should feel a stretch in the muscle, but not pain. Breathe out as you do the stretch. Then breathe in as you hold the stretch. Don't hold your breath. If you're worried about how more activity might affect your health, have a checkup before you start. Follow any special advice your doctor gives you for getting a smart start. Where can you learn more? Go to http://www.gray.com/  Enter [id number] in the search box to learn more about \"Learning About Being Physically Active. \"  Current as of: May 12, 2021               Content Version: 13.2  © 1417-7451 [employer]. Care instructions adapted under license by [employer] (which disclaims liability or warranty for this information). If you have questions about a medical condition or this instruction, always ask your healthcare professional. [place] [address] any warranty or liability for your use of this information.
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
## 2022-09-09 LAB
ALBUMIN SERPL-MCNC: 4 G/DL (ref 4–5)
ALBUMIN/GLOB SERPL: 1.1 {RATIO} (ref 1.2–2.2)
ALP SERPL-CCNC: 62 IU/L (ref 44–121)
ALT SERPL-CCNC: 19 IU/L (ref 0–44)
AST SERPL-CCNC: 21 IU/L (ref 0–40)
BASOPHILS # BLD AUTO: 0.1 X10E3/UL (ref 0–0.2)
BASOPHILS NFR BLD AUTO: 1 %
BILIRUB SERPL-MCNC: 0.4 MG/DL (ref 0–1.2)
BUN SERPL-MCNC: 9 MG/DL (ref 6–20)
BUN/CREAT SERPL: 9 (ref 9–20)
CALCIUM SERPL-MCNC: 9.3 MG/DL (ref 8.7–10.2)
CHLORIDE SERPL-SCNC: 104 MMOL/L (ref 96–106)
CO2 SERPL-SCNC: 21 MMOL/L (ref 20–29)
CREAT SERPL-MCNC: 0.99 MG/DL (ref 0.76–1.27)
EGFR: 103 ML/MIN/1.73
EOSINOPHIL # BLD AUTO: 0.3 X10E3/UL (ref 0–0.4)
EOSINOPHIL NFR BLD AUTO: 5 %
ERYTHROCYTE [DISTWIDTH] IN BLOOD BY AUTOMATED COUNT: 18.5 % (ref 11.6–15.4)
GLOBULIN SER CALC-MCNC: 3.7 G/DL (ref 1.5–4.5)
GLUCOSE SERPL-MCNC: 85 MG/DL (ref 65–99)
HCT VFR BLD AUTO: 43.7 % (ref 37.5–51)
HGB BLD-MCNC: 14.2 G/DL (ref 13–17.7)
IMM GRANULOCYTES # BLD AUTO: 0 X10E3/UL (ref 0–0.1)
IMM GRANULOCYTES NFR BLD AUTO: 0 %
LYMPHOCYTES # BLD AUTO: 1.8 X10E3/UL (ref 0.7–3.1)
LYMPHOCYTES NFR BLD AUTO: 32 %
MAGNESIUM SERPL-MCNC: 1.9 MG/DL (ref 1.6–2.3)
MCH RBC QN AUTO: 26.6 PG (ref 26.6–33)
MCHC RBC AUTO-ENTMCNC: 32.5 G/DL (ref 31.5–35.7)
MCV RBC AUTO: 82 FL (ref 79–97)
MONOCYTES # BLD AUTO: 0.7 X10E3/UL (ref 0.1–0.9)
MONOCYTES NFR BLD AUTO: 12 %
NEUTROPHILS # BLD AUTO: 2.9 X10E3/UL (ref 1.4–7)
NEUTROPHILS NFR BLD AUTO: 50 %
PLATELET # BLD AUTO: 170 X10E3/UL (ref 150–450)
POTASSIUM SERPL-SCNC: 4.3 MMOL/L (ref 3.5–5.2)
PROT SERPL-MCNC: 7.7 G/DL (ref 6–8.5)
RBC # BLD AUTO: 5.34 X10E6/UL (ref 4.14–5.8)
SODIUM SERPL-SCNC: 142 MMOL/L (ref 134–144)
WBC # BLD AUTO: 5.7 X10E3/UL (ref 3.4–10.8)

## 2022-09-14 ENCOUNTER — HOSPITAL ENCOUNTER (OUTPATIENT)
Age: 32
Setting detail: OUTPATIENT SURGERY
Discharge: HOME | End: 2022-09-14
Attending: SPECIALIST | Admitting: SPECIALIST
Payer: MEDICAID

## 2022-09-14 ENCOUNTER — APPOINTMENT (OUTPATIENT)
Dept: SURGERY | Age: 32
End: 2022-09-14

## 2022-09-14 ENCOUNTER — APPOINTMENT (OUTPATIENT)
Dept: GENERAL RADIOLOGY | Age: 32
End: 2022-09-14
Attending: SPECIALIST
Payer: MEDICAID

## 2022-09-14 VITALS
HEIGHT: 72 IN | DIASTOLIC BLOOD PRESSURE: 99 MMHG | WEIGHT: 315 LBS | HEART RATE: 83 BPM | TEMPERATURE: 98.2 F | SYSTOLIC BLOOD PRESSURE: 150 MMHG | BODY MASS INDEX: 42.66 KG/M2 | RESPIRATION RATE: 18 BRPM | OXYGEN SATURATION: 97 %

## 2022-09-14 DIAGNOSIS — R13.10 DYSPHAGIA, UNSPECIFIED TYPE: Primary | ICD-10-CM

## 2022-09-14 PROCEDURE — 76040000019: Performed by: SPECIALIST

## 2022-09-14 PROCEDURE — 74240 X-RAY XM UPR GI TRC 1CNTRST: CPT | Performed by: SPECIALIST

## 2022-09-14 PROCEDURE — 74240 X-RAY XM UPR GI TRC 1CNTRST: CPT

## 2022-09-14 PROCEDURE — 74011000250 HC RX REV CODE- 250: Performed by: SPECIALIST

## 2022-09-14 NOTE — PROCEDURES
[facility]    Upper GI Procedure Report      [facility]    Medical Record [id number]    [id number]    Date of Service - September 14, 2022    Pre-Op Diagnosis - patient is status post sleeve resection performed by this office 2+ months ago with complaint of dysphagia and some PO intolerances. They now present for UGI to assess their post surgical anatomy. Post-Op Diagnosis -same    Procedure - UGI study with barium    Surgeon - Alysha Noe MD    Assistant - None    Complications - None    Specimens - None    Implants - None    Estimate Blood Loss - None    Statement of Medical Necessity - Need for radiologic evaluation prior to further management of their care. .    Procedure -the patient was brought to the fluoroscopy suite where they were given thin barium. On swallowing the barium the patient was noted to have normal peristalsis of their esophagus with progressive flow into the distal esophagus. Specific findings of the distal esophagus revealed that they did not have a hiatal hernia. Contrast flowed normally through the esophagus and into the sleeve without reflux or obstruction. The stomach filled in a timely manner and emptied into the intestine without issue. The anatomy was normal for the timeframe with no stricture or obstruction or any other abnormality. There was nothing on today's findings that would explain his symptoms. He needs to evaluate his diet and scale back on the complexity of his foods and continue with his reglan as directed.       Alysha Noe MD

## 2022-10-11 ENCOUNTER — OFFICE VISIT (OUTPATIENT)
Dept: SURGERY | Age: 32
End: 2022-10-11
Payer: MEDICAID

## 2022-10-11 VITALS
OXYGEN SATURATION: 100 % | SYSTOLIC BLOOD PRESSURE: 139 MMHG | HEART RATE: 84 BPM | BODY MASS INDEX: 42.66 KG/M2 | DIASTOLIC BLOOD PRESSURE: 78 MMHG | HEIGHT: 72 IN | WEIGHT: 315 LBS | TEMPERATURE: 97.1 F

## 2022-10-11 DIAGNOSIS — Z98.84 S/P LAPAROSCOPIC SLEEVE GASTRECTOMY: ICD-10-CM

## 2022-10-11 DIAGNOSIS — K90.9 INTESTINAL MALABSORPTION, UNSPECIFIED TYPE: Primary | ICD-10-CM

## 2022-10-11 PROCEDURE — 99214 OFFICE O/P EST MOD 30 MIN: CPT | Performed by: NURSE PRACTITIONER
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
## 2022-10-11 NOTE — PATIENT INSTRUCTIONS
[employer] or My [employer]itness Pal Dariel  90-100g protein  1000 calories   Keep carbs below 50g                                                                 Patient Instructions      Remember hydration goals - minimum of 64 ounces of liquids per day (dehydration is the number one reason for hospital readmission). Continue to monitor carbohydrate and protein intake you need a minimum of  Grams of protein daily- remember to keep your total carbohydrates to 50 grams or less per day for best results. Continue to work towards exercise goals - 60-90 minutes, 5 times a week minimum of deliberate, aerobic exercise is the ultimate goal with strength training 2 times each week. Refer to [employer] for  information. Remember to take vitamins as directed. Attend support group the 2nd Thursday of each month. 6.  Constipation: Milk of Magnesia is for immediate relief only. Miralax is to be used every day if constipation is a chronic problem. 7.  Diarrhea: patients will occasionally develop lactose intolerance after surgery. Check to see if your protein shake has whey in it. If it does try a protein powder or drink that does not have whey and stop all yogurts, cheeses and milks to see if the diarrhea goes away. 8.  If you have had labs drawn. We will only call you if you have abnormal results. Otherwise you can access the lab results in \"mychart\". You will only need the access code the first time you sign on. 9.  Call us at [phone] or email us through [facility]" with questions,     concerns or worsening of condition, we have someone on call 24 hours a day. If you are unable to reach our office, you are to go to your Primary Care Physician or the Emergency Department.      NOTE TO GASTRIC BYPASS PATIENTS:  (SAME APPLIES TO GASTRIC SLEEVE PATIENTS FOR FIRST TWO MONTHS)  Remember that for the rest of your life, you are not able to take the following:  - NSAIDs (ibuprofen, goody powder, BC powder, Motrin, Advil, Mobic, Voltaren, Excedrin, etc.)  - Steroid pills or injections  - Smoke (cigarettes or recreational drugs)  - Alcohol  Use of any of the above may cause ulcers in your stomach which may perforate causing a medical emergency and surgery. Speak to our medical staff if another medical provider requires you to take steroids or NSAIDs. Supplement Resource Guide    Importance of Protein:   Maintains lean body mass, produces antibodies to fight off infections, heals wounds, minimizes hair loss, helps to give you energy, helps with satiety, and keeping you full between meals. Importance of Calcium:  Needed for healthy bones and teeth, normal blood clotting, and nervous system functioning, higher risk of osteoporosis and bone disease with non-compliance. Importance of Multivitamins: Many functions. Supply you with extra nutrients that you may be missing from food. May lead to iron deficiency anemia, weakness, fatigue, and many other symptoms with non-compliance. Importance of B Vitamins:  Important for red blood cell formation, metabolism, energy, and helps to maintain a healthy nervous system. Protein Supplement  Find one you like now. Use immediately after surgery. Look for:  35-50g protein each day from your protein supplement once you reach the progression diet. 0-3 g fat per serving  0-3 g sugar per serving    Protein drinks should be split in separate dosages. Recommend: Lifelong  1 year + Calcium Supplement:     Start taking within a month after surgery. Look for: Calcium Citrate Plus D (1500 mg per day)  Recommend: Citracal     .            Avoid chocolate chewable calcium. Can use chewable bariatric or [employer] brand or similar chewable. The body cannot absorb more than 500-600 mg of calcium at a time. Take for Life Multi-vitamin Supplement:      Start immediately after surgery: any complete chewable, such as: [place]s Complete chewables.     Avoid [place] sours or gummies. They lack iron and other important nutrients and also have added sugar. Continue with chewable vitamin or change to adult complete multivitamin one month after surgery. Menstruating women can take a prenatal vitamin. Make sure has at least 18 mg iron and 474-736 mcg folic acid   Vitamin N97, B Complex Vitamin, and Biotin  Start taking within a month after surgery. Vitamin B12:  1000 mcg of Vitamin B12 three times weekly    Must take sublingually (meaning you take it under your tongue) or in a liquid drop form for easy absorption. B Complex Vitamin: Take a pill or liquid drop form once daily. Biotin: This vitamin can help prevent hair loss. Recommend 5mg   (5000 mcg) a day  Biotin is Optional           Learning About Being Physically Active  What is physical activity? Being physically active means doing any kind of activity that gets your body moving. The types of physical activity that can help you get fit and stay healthy include:  Aerobic or \"cardio\" activities. These make your heart beat faster and make you breathe harder, such as brisk walking, riding a bike, or running. They strengthen your heart and lungs and build up your endurance. Strength training activities. These make your muscles work against, or \"resist,\" something. Examples include lifting weights or doing push-ups. These activities help tone and strengthen your muscles and bones. Stretches. These let you move your joints and muscles through their full range of motion. Stretching helps you be more flexible. What are the benefits of being active? Being active is one of the best things you can do for your health. It helps you to:  Feel stronger and have more energy to do all the things you like to do. Focus better at school or work. Feel, think, and sleep better. Reach and stay at a healthy weight. Lose fat and build lean muscle.   Lower your risk for serious health problems, including diabetes, heart attack, high blood pressure, and some cancers. Keep your heart, lungs, bones, muscles, and joints strong and healthy. How can you make being active part of your life? Start slowly. Make it your long-term goal to get at least 30 minutes of exercise on most days of the week. Walking is a good choice. You also may want to do other activities, such as running, swimming, cycling, or playing tennis or team sports. Pick activities that you like--ones that make your heart beat faster, your muscles stronger, and your muscles and joints more flexible. If you find more than one thing you like doing, do them all. You don't have to do the same thing every day. Get your heart pumping every day. Any activity that makes your heart beat faster and keeps it at that rate for a while counts. Here are some great ways to get your heart beating faster:  Go for a brisk walk, run, or bike ride. Go for a hike or swim. Go in-line skating. Play a game of touch football, basketball, or soccer. Ride a bike. Play tennis or racquetball. Climb stairs. Even some household chores can be aerobic--just do them at a faster pace. Vacuuming, raking or mowing the lawn, sweeping the garage, and washing and waxing the car all can help get your heart rate up. Strengthen your muscles during the week. You don't have to lift heavy weights or grow big, bulky muscles to get stronger. Doing a few simple activities that make your muscles work against, or \"resist,\" something can help you get stronger. For example, you can:  Do push-ups or sit-ups, which use your own body weight as resistance. Lift weights or dumbbells or use stretch bands at home or in a gym or community center. Stretch your muscles often. Stretching will help you as you become more active. It can help you stay flexible, loosen tight muscles, and avoid injury. It can also help improve your balance and posture and can be a great way to relax.   Be sure to stretch the muscles you'll be using when you work out. It's best to warm your muscles slightly before you stretch them. Walk or do some other light aerobic activity for a few minutes, and then start stretching. When you stretch your muscles:  Do it slowly. Stretching is not about going fast or making sudden movements. Don't push or bounce during a stretch. Hold each stretch for at least 15 to 30 seconds, if you can. You should feel a stretch in the muscle, but not pain. Breathe out as you do the stretch. Then breathe in as you hold the stretch. Don't hold your breath. If you're worried about how more activity might affect your health, have a checkup before you start. Follow any special advice your doctor gives you for getting a smart start. Where can you learn more? Go to http://www.gray.com/  Enter [id number] in the search box to learn more about \"Learning About Being Physically Active. \"  Current as of: May 12, 2021               Content Version: 13.2  © 2006-2022 Healthwise, Incorporated. Care instructions adapted under license by [employer] (which disclaims liability or warranty for this information). If you have questions about a medical condition or this instruction, always ask your healthcare professional. [address] any warranty or liability for your use of this information.

## 2023-01-02 ENCOUNTER — APPOINTMENT (OUTPATIENT)
Dept: CT IMAGING | Age: 33
End: 2023-01-02
Attending: EMERGENCY MEDICINE
Payer: MEDICAID

## 2023-01-02 ENCOUNTER — HOSPITAL ENCOUNTER (EMERGENCY)
Age: 33
Discharge: HOME | End: 2023-01-02
Attending: EMERGENCY MEDICINE
Payer: MEDICAID

## 2023-01-02 VITALS
OXYGEN SATURATION: 99 % | WEIGHT: 315 LBS | HEART RATE: 74 BPM | HEIGHT: 72 IN | DIASTOLIC BLOOD PRESSURE: 94 MMHG | RESPIRATION RATE: 18 BRPM | BODY MASS INDEX: 42.66 KG/M2 | TEMPERATURE: 97.5 F | SYSTOLIC BLOOD PRESSURE: 137 MMHG

## 2023-01-02 DIAGNOSIS — R09.89 GLOBUS SENSATION: Primary | ICD-10-CM

## 2023-01-02 PROCEDURE — 99284 EMERGENCY DEPT VISIT MOD MDM: CPT

## 2023-01-02 PROCEDURE — 70490 CT SOFT TISSUE NECK W/O DYE: CPT

## 2023-01-02 RX ORDER — OMEPRAZOLE 20 MG/1
20 CAPSULE, DELAYED RELEASE ORAL 2 TIMES DAILY
Qty: 60 CAPSULE | Refills: 0 | Status: SHIPPED | OUTPATIENT
Start: 2023-01-02 | End: 2023-02-01

## 2023-01-02 NOTE — ED TRIAGE NOTES
Pt states \" For the last 2 weeks I have been having a feeling like something is stuck in his throat. No trouble breathing, able to eat and drink, denies sore throat, hoarse or any other symptom. \"
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
## 2023-01-02 NOTE — DISCHARGE INSTRUCTIONS
Follow-up with gastroenterology. You will need to call to make an appointment.   Return to the ED for worsening symptoms

## 2023-01-10 ENCOUNTER — HOSPITAL ENCOUNTER (OUTPATIENT)
Dept: LAB | Age: 33
Discharge: HOME | End: 2023-01-10

## 2023-01-10 LAB — XX-LABCORP SPECIMEN COL,LCBCF: NORMAL

## 2023-01-10 PROCEDURE — 99001 SPECIMEN HANDLING PT-LAB: CPT

## 2023-02-03 RX ORDER — ERGOCALCIFEROL 1.25 MG/1
50000 CAPSULE ORAL
Qty: 26 CAPSULE | Refills: 0 | Status: SHIPPED | OUTPATIENT
Start: 2023-02-03

## 2023-03-02 ENCOUNTER — OFFICE VISIT (OUTPATIENT)
Age: 33
End: 2023-03-02
Payer: MEDICAID

## 2023-03-02 ENCOUNTER — HOSPITAL ENCOUNTER (OUTPATIENT)
Facility: HOSPITAL | Age: 33
Discharge: HOME | End: 2023-03-05

## 2023-03-02 ENCOUNTER — HOSPITAL ENCOUNTER (OUTPATIENT)
Facility: HOSPITAL | Age: 33
Discharge: HOME | End: 2023-03-02
Payer: MEDICAID

## 2023-03-02 VITALS
BODY MASS INDEX: 42.66 KG/M2 | TEMPERATURE: 97.3 F | WEIGHT: 315 LBS | SYSTOLIC BLOOD PRESSURE: 137 MMHG | OXYGEN SATURATION: 97 % | DIASTOLIC BLOOD PRESSURE: 79 MMHG | HEIGHT: 72 IN | HEART RATE: 80 BPM

## 2023-03-02 DIAGNOSIS — Z98.84 S/P LAPAROSCOPIC SLEEVE GASTRECTOMY: ICD-10-CM

## 2023-03-02 DIAGNOSIS — K21.9 GASTROESOPHAGEAL REFLUX DISEASE, UNSPECIFIED WHETHER ESOPHAGITIS PRESENT: ICD-10-CM

## 2023-03-02 DIAGNOSIS — R10.9 LEFT FLANK PAIN: Primary | ICD-10-CM

## 2023-03-02 DIAGNOSIS — K90.9 INTESTINAL MALABSORPTION, UNSPECIFIED TYPE: ICD-10-CM

## 2023-03-02 DIAGNOSIS — R10.9 LEFT FLANK PAIN: ICD-10-CM

## 2023-03-02 DIAGNOSIS — E55.9 HYPOVITAMINOSIS D: ICD-10-CM

## 2023-03-02 LAB — LABCORP SPECIMEN COLLECTION: NORMAL

## 2023-03-02 PROCEDURE — 99214 OFFICE O/P EST MOD 30 MIN: CPT | Performed by: NURSE PRACTITIONER

## 2023-03-02 PROCEDURE — 3075F SYST BP GE 130 - 139MM HG: CPT | Performed by: NURSE PRACTITIONER

## 2023-03-02 PROCEDURE — 74018 RADEX ABDOMEN 1 VIEW: CPT

## 2023-03-02 PROCEDURE — 99001 SPECIMEN HANDLING PT-LAB: CPT

## 2023-03-02 PROCEDURE — 3078F DIAST BP <80 MM HG: CPT | Performed by: NURSE PRACTITIONER

## 2023-03-02 RX ORDER — MULTIVIT WITH IRON,MINERALS
1 TABLET,CHEWABLE ORAL DAILY
COMMUNITY

## 2023-03-02 RX ORDER — ONDANSETRON 8 MG/1
8 TABLET, ORALLY DISINTEGRATING ORAL
COMMUNITY
Start: 2022-06-27

## 2023-03-02 RX ORDER — CALCIUM CARBONATE 500(1250)
500 TABLET ORAL DAILY
COMMUNITY

## 2023-03-02 RX ORDER — ERGOCALCIFEROL 1.25 MG/1
CAPSULE ORAL
COMMUNITY
Start: 2023-02-03

## 2023-03-02 RX ORDER — ACETAMINOPHEN 500 MG
1000 TABLET ORAL PRN
COMMUNITY
Start: 2020-09-09

## 2023-03-02 ASSESSMENT — PATIENT HEALTH QUESTIONNAIRE - PHQ9
2. FEELING DOWN, DEPRESSED OR HOPELESS: 0
SUM OF ALL RESPONSES TO PHQ QUESTIONS 1-9: 0
SUM OF ALL RESPONSES TO PHQ9 QUESTIONS 1 & 2: 0
1. LITTLE INTEREST OR PLEASURE IN DOING THINGS: 0

## 2023-03-02 NOTE — PATIENT INSTRUCTIONS
Patient Instructions      Remember hydration goals - minimum of 64 ounces of liquids per day (dehydration is the number one reason for hospital readmission). Continue to monitor carbohydrate and protein intake you need a minimum of  Grams of protein daily- remember to keep your total carbohydrates to 50 grams or less per day for best results. Continue to work towards exercise goals - 60-90 minutes, 5 times a week minimum of deliberate, aerobic exercise is the ultimate goal with strength training 2 times each week. Refer to [employer] for  information. Remember to take vitamins as directed. Attend support group the 2nd Thursday of each month. 6.  Constipation: Milk of Magnesia is for immediate relief only. Miralax is to be used every day if constipation is a chronic problem. 7.  Diarrhea: patients will occasionally develop lactose intolerance after surgery. Check to see if your protein shake has whey in it. If it does try a protein powder or drink that does not have whey and stop all yogurts, cheeses and milks to see if the diarrhea goes away. 8.  If you have had labs drawn. We will only call you if you have abnormal results. Otherwise you can access the lab results in \"[facility]\". You will only need the access code the first time you sign on. 9.  Call us at [phone] or email us through [facility]" with questions,     concerns or worsening of condition, we have someone on call 24 hours a day. If you are unable to reach our office, you are to go to your Primary Care Physician or the Emergency Department.      NOTE TO GASTRIC BYPASS PATIENTS:  (SAME APPLIES TO GASTRIC SLEEVE PATIENTS FOR FIRST TWO MONTHS)  Remember that for the rest of your life, you are not able to take the following:  - NSAIDs (ibuprofen, goody powder, BC powder, Motrin, Advil, Mobic, Voltaren, Excedrin, etc.)  - Steroid pills or injections  - Smoke (cigarettes or recreational drugs)  - Alcohol  Use of any of the above may cause ulcers in your stomach which may perforate causing a medical emergency and surgery. Speak to our medical staff if another medical provider requires you to take steroids or NSAIDs. Supplement Resource Guide    Importance of Protein:   Maintains lean body mass, produces antibodies to fight off infections, heals wounds, minimizes hair loss, helps to give you energy, helps with satiety, and keeping you full between meals. Importance of Calcium:  Needed for healthy bones and teeth, normal blood clotting, and nervous system functioning, higher risk of osteoporosis and bone disease with non-compliance. Importance of Multivitamins: Many functions. Supply you with extra nutrients that you may be missing from food. May lead to iron deficiency anemia, weakness, fatigue, and many other symptoms with non-compliance. Importance of B Vitamins:  Important for red blood cell formation, metabolism, energy, and helps to maintain a healthy nervous system. Protein Supplement  Find one you like now. Use immediately after surgery. Look for:  35-50g protein each day from your protein supplement once you reach the progression diet. 0-3 g fat per serving  0-3 g sugar per serving    Protein drinks should be split in separate dosages. Recommend: Lifelong  1 year + Calcium Supplement:     Start taking within a month after surgery. Look for: Calcium Citrate Plus D (1500 mg per day)  Recommend: Citracal     .            Avoid chocolate chewable calcium. Can use chewable bariatric or [employer] brand or similar chewable. The body cannot absorb more than 500-600 mg of calcium at a time. Take for Life Multi-vitamin Supplement:      Start immediately after surgery: any complete chewable, such as: Kulms Complete chewables. Avoid Kulm sours or gummies. They lack iron and other important nutrients and also have added sugar.     Continue with chewable vitamin or change to adult complete multivitamin one month after surgery. Menstruating women can take a prenatal vitamin. Make sure has at least 18 mg iron and 268-953 mcg folic acid   Vitamin L61, B Complex Vitamin, and Biotin  Start taking within a month after surgery. Vitamin B12:  1000 mcg of Vitamin B12 three times weekly    Must take sublingually (meaning you take it under your tongue) or in a liquid drop form for easy absorption. B Complex Vitamin: Take a pill or liquid drop form once daily. Biotin: This vitamin can help prevent hair loss.     Recommend 5mg   (5000 mcg) a day  Biotin is Optional

## 2023-03-02 NOTE — PROGRESS NOTES
Subjective:     Duane Jones  is a 27 y.o. male who presents for follow-up about 8 months following laparoscopic sleeve gastrectomy. Surgery related complication: NA. He has lost a total of 110 pounds since surgery. Body mass index is 46.63 kg/m². Leandra Felderd Loss of EBW 38%. Concerned about weight loss plateau. Has lost 20 lbs since 3 month postop visit. The patient presents today to assess their progress toward their weight loss goal & to address any issues that may be present:   He is tolerating solids without difficulty, reports 4 days of sharp left sided pain that radiates to left flank. No other associated symptoms. Denies hematuria, no previous issues with kidney stones. Does still have episodes of reflux with certain foods. He denies vomiting, abdominal pain, difficulty swallowing and nausea. The patients diet choices have been reviewed today and counseling was given. Fluid intake: 60 oz      Protein intake: no shakes; chicken      Meals/day: 3-4    Taking vitamins as recommended. 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 Had ED visit 1/2/23 for globus sensation, discharged with prilosec for reflux after normal CT neck. Saw GI and Dr Sunny Cleary did EGD and dilated esophagus with 60% improvement in symptoms.     Had normal abd CT done 3/22 for LUQ pain    Comorbidities:    Hypertension: improved, off Rx  Diabetes: improved, no Rx  Obstructive Sleep Apnea: unchanged, using CPAP  Hyperlipidemia: not applicable  Stress Urinary Incontinence: not applicable  Gastroesophageal Reflux: improved, on PPI  Weight related arthropathy:unchanged, chronic back pain      Patient Active Problem List   Diagnosis    Chronic back pain    Anemia    GERD (gastroesophageal reflux disease)    Sleep disorder breathing    Diabetes mellitus ([facility] [address]    Anxiety    Morbid obesity (HCC)    Sleep apnea Hypertension    Snores    Intestinal malabsorption    S/P laparoscopic sleeve gastrectomy     Past Medical History:   Diagnosis Date    Anemia     Anxiety     Chronic back pain     Diabetes ([address]     GERD (gastroesophageal reflux disease)     Hypertension     S/P laparoscopic sleeve gastrectomy     Sleep apnea      Past Surgical History:   Procedure Laterality Date    CYST REMOVAL      neck    SLEEVE GASTRECTOMY       Current Outpatient Medications   Medication Sig Dispense Refill    acetaminophen (TYLENOL) 500 MG tablet Take 1,000 mg by mouth as needed      vitamin D (ERGOCALCIFEROL) 1.25 MG (79516 UT) CAPS capsule TAKE 1 CAPSULE BY MOUTH EVERY 7 DAYS INDICATIONS: VITAMIN D DEFICIENCY (HIGH DOSE THERAPY)      ondansetron (ZOFRAN-ODT) 8 MG TBDP disintegrating tablet Take 8 mg by mouth      Pediatric Multivitamins-Iron (FLINTSTONES COMPLETE) 10 MG CHEW tablet Take 1 tablet by mouth daily      calcium carbonate (OSCAL) 500 MG TABS tablet Take 500 mg by mouth daily      Cyanocobalamin (VITAMIN B 12 PO) Take by mouth       No current facility-administered medications for this visit.        Review of Systems:  General - Denies fatigue, fever, chills  Cardiac - Denies chest pain, palpitations, shortness of breath  Pulmonary - Denies shortness of breath or productive cough  Gastrointestinal - as noted above  Musculoskeletal - Denies joint or muscle weakness, pain, stiffness  Hematologic - Denies abnormal bleeding or bruising  Neurologic - Denies weakness, paralysis, numbness, tingling    Objective:     /79 (Site: Left Upper Arm, Position: Sitting, Cuff Size: Large Adult)   Pulse 80   Temp 97.3 °F (36.3 °C)   Ht 6' (1.829 m)   Wt (!) 343 lb 12.8 oz (155.9 kg)   SpO2 97%   BMI 46.63 kg/m²      Physical Exam:      General appearance:  alert, cooperative, no distress, appears stated age   Mental status   alert, oriented to person, place, and time   Neck  supple, no significant adenopathy     Lymphatics  no palpable lymphadenopathy, no hepatosplenomegaly   Chest  clear to auscultation, no wheezes, rales or rhonchi, symmetric air entry   Heart  normal rate, regular rhythm, normal S1, S2, no murmurs, rubs, clicks or gallops    Abdomen: soft, nontender, nondistended, no masses or organomegaly   Incision:  Well healed, no hernias      Neurological  alert, oriented, normal speech, no focal findings or movement disorder noted   Musculoskeletal no joint tenderness, deformity or swelling   Extremities peripheral pulses normal, no pedal edema, no clubbing or cyanosis   Skin normal coloration and turgor, no rashes, no suspicious skin lesions noted            Labs:     CMP:   Lab Results   Component Value Date/Time     01/10/2023 03:20 PM    K 4.4 01/10/2023 03:20 PM     01/10/2023 03:20 PM    CO2 24 01/10/2023 03:20 PM    BUN 12 01/10/2023 03:20 PM    CREATININE 0.96 01/10/2023 03:20 PM    GLUCOSE 97 01/10/2023 03:20 PM    CALCIUM 9.7 01/10/2023 03:20 PM    PROT 7.5 01/10/2023 03:20 PM    LABALBU 3.8 01/10/2023 03:20 PM    BILITOT 0.3 01/10/2023 03:20 PM    AST 13 01/10/2023 03:20 PM    ALT 9 01/10/2023 03:20 PM      CBC:   Lab Results   Component Value Date/Time    WBC 4.9 01/10/2023 03:20 PM    RBC 5.11 01/10/2023 03:20 PM    HGB 14.5 01/10/2023 03:20 PM    HCT 46.0 01/10/2023 03:20 PM    MCV 90 01/10/2023 03:20 PM    MCH 28.4 01/10/2023 03:20 PM    MCHC 31.5 01/10/2023 03:20 PM    RDW 14.0 01/10/2023 03:20 PM     01/10/2023 03:20 PM    MPV 11.6 08/04/2022 08:55 PM      VITAMIN D:   Lab Results   Component Value Date/Time    VITD25 15.4 01/10/2023 03:20 PM     VITAMIN B12 AND FOLATE:   Lab Results   Component Value Date/Time    NVMCCDNY95 274 01/10/2023 03:20 PM    FOLATE 4.0 01/10/2023 03:20 PM     IRON:   Lab Results   Component Value Date/Time    IRON 57 01/10/2023 03:20 PM     FERRITIN:   Lab Results   Component Value Date/Time    FERRITIN 90 01/10/2023 03:20 PM     VITAMIN B1:   Lab Results   Component  Value Date/Time    PQMO2XGMFEP 87.2 01/10/2023 03:20 PM         3/5/22 CT ABD/PELV  FINDINGS:       LOWER CHEST: Unremarkable. LIVER, BILIARY: Liver is normal. No biliary dilation. Unremarkable gallbladder. PANCREAS: Normal.       SPLEEN: Normal.       ADRENALS: Normal.       KIDNEYS/URETERS/BLADDER: Normal.       LYMPH NODES: No enlarged lymph nodes. GASTROINTESTINAL TRACT: Normal appendix. No bowel dilatation or mural   thickening. PELVIC ORGANS: Unremarkable. VASCULATURE: Unremarkable. BONES: No acute or aggressive osseous abnormalities identified. OTHER: Small fat-containing umbilical hernia. Assessment and Plan:   Intestinal malabsorption  continue required Vitamins: B12, B complex, D, iron, calcium, multivitamin  S/p laparoscopic bariatric surgery,laparoscopic sleeve gastrectomy , history of morbid obesity. Reviewed weight loss progress and recommend using food tracking desean to ensure adequate protein and caloric intake. Aim for 100 g protein daily 0790-6846 calories. Keep daily carbs below 50g. Continue to increase exercise. Would like for him to meet with dietitian for additional reinforcement. Sleep goal is 7-9 hours each night. Patient education given on the effects of sleep deprivation on weight control. Discussed patients weight loss goals and dietary choices in relation to goals. Reminded to measure portions, continue high protein, low carbohydrate diet. Reminded to eat regularly, to eat slowly & not to drink with meals. Continue cardio exercise and add resistance exercises. 60-90 minutes of aerobic activity 5 days a week and strength training 2 days each week. Encouraged to attend support group   Required fluid intake is >64oz daily of decaffeinated sugar free beverages. 3. Hypovitaminosis D - started weekly D2 after 1/10/23 lab result  4. B12 deficiency - switch to 1000 mcg SL B12 and repeat level at next visit  5.  Flank pain - check UA and KUB for possible kidney stone. Signs and symptoms more suggestive of nephrolithiasis or musculoskeletal versus any complication related to sleeve gastrectomy. F/u PCP  6. GERD - continue PPI, elevate HOB, avoid spicy/acidic foods    Patient to complete labs before next visit. Lab slip given today. I have discussed this plan with patient and they verbalized understanding    30 minutes spent with patient    Follow up in 4 months or sooner if patient has questions, concerns or worsening of condition, if unable to reach our office, patient should report to the ED. Mr. Yelena Ivan has a reminder for a \"due or due soon\" health maintenance. I have asked that he contact his primary care provider for a follow-up on this health maintenance.

## 2023-03-03 ENCOUNTER — TELEPHONE (OUTPATIENT)
Age: 33
End: 2023-03-03

## 2023-03-03 NOTE — TELEPHONE ENCOUNTER
Unable to LVM  due to mail box being full   I was calling  to let  know that his x-ray and UA was normal and doesn't look like kidney stones . Nona Sy recommends following up with his PCP because it sounds like it could be muscular    Will try to call again. Eloy Carrasquillo

## 2023-03-07 ENCOUNTER — TELEPHONE (OUTPATIENT)
Age: 33
End: 2023-03-07

## 2023-06-03 ENCOUNTER — HOSPITAL ENCOUNTER (EMERGENCY)
Facility: HOSPITAL | Age: 33
Discharge: HOME | End: 2023-06-03
Attending: EMERGENCY MEDICINE
Payer: MEDICAID

## 2023-06-03 VITALS
HEIGHT: 72 IN | DIASTOLIC BLOOD PRESSURE: 98 MMHG | WEIGHT: 315 LBS | HEART RATE: 84 BPM | OXYGEN SATURATION: 98 % | TEMPERATURE: 97.3 F | RESPIRATION RATE: 16 BRPM | SYSTOLIC BLOOD PRESSURE: 148 MMHG | BODY MASS INDEX: 42.66 KG/M2

## 2023-06-03 DIAGNOSIS — M25.511 ACUTE PAIN OF RIGHT SHOULDER: Primary | ICD-10-CM

## 2023-06-03 DIAGNOSIS — M67.911 TENDINOPATHY OF RIGHT ROTATOR CUFF: ICD-10-CM

## 2023-06-03 PROCEDURE — 96372 THER/PROPH/DIAG INJ SC/IM: CPT

## 2023-06-03 PROCEDURE — 6360000002 HC RX W HCPCS: Performed by: EMERGENCY MEDICINE

## 2023-06-03 PROCEDURE — 99284 EMERGENCY DEPT VISIT MOD MDM: CPT

## 2023-06-03 RX ORDER — KETOROLAC TROMETHAMINE 30 MG/ML
30 INJECTION, SOLUTION INTRAMUSCULAR; INTRAVENOUS
Status: COMPLETED | OUTPATIENT
Start: 2023-06-03 | End: 2023-06-03

## 2023-06-03 RX ADMIN — KETOROLAC TROMETHAMINE 30 MG: 30 INJECTION, SOLUTION INTRAMUSCULAR; INTRAVENOUS at 20:19

## 2023-06-03 ASSESSMENT — PAIN SCALES - GENERAL
PAINLEVEL_OUTOF10: 8
PAINLEVEL_OUTOF10: 9

## 2023-06-03 NOTE — ED PROVIDER NOTES
[facility] EMERGENCY DEPT  EMERGENCY DEPARTMENT ENCOUNTER    Patient Name: Tiffany Cardozo  MRN: [id number]  YOB: 1990  Provider: Jessie Briggs MD  PCP: Trini Matson MD   Time/Date of evaluation: 7:37 PM EDT on 6/3/23    History of Presenting Illness     Chief Complaint   Patient presents with    Shoulder Pain     Pt presents c/o right shoulder pain x 3 months. Pt has seen his orthopedist 3 weeks ago and nothing was found. History Provided By: patient, EMR     History (Narative):   Tiffany Cardozo is a 27 y.o. male who presents to the emergency department with chronic right shoulder pain. He has had the pain for years, but it has been worsening over the last several months. He saw an orthopedic surgeon at [facility] in April and was suspected to have rotator cuff tendinopathy and possible tear. Plan was made for conservative management. He had a corticosteroid injection, which provided him relief for approximately a week, but after that pain has returned and persisted. He is taking over-the-counter pain medications. No new falls or trauma. Here today for reevaluation. Nursing Notes were all reviewed and agreed with or any disagreements were addressed in the HPI. Past History     Past Medical History:  Past Medical History:   Diagnosis Date    Anemia     Anxiety     Chronic back pain     Diabetes ([address]     GERD (gastroesophageal reflux disease)     Hypertension     S/P laparoscopic sleeve gastrectomy     Sleep apnea        Past Surgical History:  Past Surgical History:   Procedure Laterality Date    CYST REMOVAL      neck    SLEEVE GASTRECTOMY         Family History:  History reviewed. No pertinent family history.     Social History:  Social History     Tobacco Use    Smoking status: Never    Smokeless tobacco: Never       Medications:  Current Facility-Administered Medications   Medication Dose Route Frequency Provider Last Rate Last Admin    ketorolac (TORADOL) injection 30 mg  30 mg

## 2023-06-03 NOTE — ED NOTES
Pt presents c/o right shoulder pain x 3 months, NKT. Pt has seen his orthopedist 3 weeks ago and nothing was found.       Lavinia Garner RN  06/03/23 [id number]

## 2024-02-10 ENCOUNTER — HOSPITAL ENCOUNTER (EMERGENCY)
Facility: HOSPITAL | Age: 34
Discharge: HOME | End: 2024-02-11
Attending: EMERGENCY MEDICINE
Payer: MEDICAID

## 2024-02-10 ENCOUNTER — APPOINTMENT (OUTPATIENT)
Facility: HOSPITAL | Age: 34
End: 2024-02-10
Payer: MEDICAID

## 2024-02-10 VITALS
OXYGEN SATURATION: 97 % | WEIGHT: 315 LBS | HEIGHT: 72 IN | RESPIRATION RATE: 18 BRPM | DIASTOLIC BLOOD PRESSURE: 73 MMHG | TEMPERATURE: 98.2 F | SYSTOLIC BLOOD PRESSURE: 117 MMHG | HEART RATE: 60 BPM | BODY MASS INDEX: 42.66 KG/M2

## 2024-02-10 DIAGNOSIS — R06.02 SHORTNESS OF BREATH: Primary | ICD-10-CM

## 2024-02-10 LAB
ALBUMIN SERPL-MCNC: 3.6 G/DL (ref 3.4–5)
ALBUMIN/GLOB SERPL: 0.9 (ref 0.8–1.7)
ALP SERPL-CCNC: 69 U/L (ref 45–117)
ALT SERPL-CCNC: 15 U/L (ref 16–61)
ANION GAP SERPL CALC-SCNC: 1 MMOL/L (ref 3–18)
AST SERPL-CCNC: 20 U/L (ref 10–38)
BASOPHILS # BLD: 0 K/UL (ref 0–0.1)
BASOPHILS NFR BLD: 1 % (ref 0–2)
BILIRUB SERPL-MCNC: 0.6 MG/DL (ref 0.2–1)
BUN SERPL-MCNC: 10 MG/DL (ref 7–18)
BUN/CREAT SERPL: 10 (ref 12–20)
CALCIUM SERPL-MCNC: 9.3 MG/DL (ref 8.5–10.1)
CHLORIDE SERPL-SCNC: 112 MMOL/L (ref 100–111)
CO2 SERPL-SCNC: 29 MMOL/L (ref 21–32)
CREAT SERPL-MCNC: 1.05 MG/DL (ref 0.6–1.3)
D DIMER PPP FEU-MCNC: <0.27 UG/ML(FEU)
DIFFERENTIAL METHOD BLD: ABNORMAL
EOSINOPHIL # BLD: 0.3 K/UL (ref 0–0.4)
EOSINOPHIL NFR BLD: 7 % (ref 0–5)
ERYTHROCYTE [DISTWIDTH] IN BLOOD BY AUTOMATED COUNT: 13.7 % (ref 11.6–14.5)
GLOBULIN SER CALC-MCNC: 3.9 G/DL (ref 2–4)
GLUCOSE SERPL-MCNC: 93 MG/DL (ref 74–99)
HCT VFR BLD AUTO: 47.7 % (ref 36–48)
HGB BLD-MCNC: 15.4 G/DL (ref 13–16)
IMM GRANULOCYTES # BLD AUTO: 0 K/UL (ref 0–0.04)
IMM GRANULOCYTES NFR BLD AUTO: 0 % (ref 0–0.5)
LYMPHOCYTES # BLD: 1.5 K/UL (ref 0.9–3.6)
LYMPHOCYTES NFR BLD: 34 % (ref 21–52)
MCH RBC QN AUTO: 30.1 PG (ref 24–34)
MCHC RBC AUTO-ENTMCNC: 32.3 G/DL (ref 31–37)
MCV RBC AUTO: 93.3 FL (ref 78–100)
MONOCYTES # BLD: 0.5 K/UL (ref 0.05–1.2)
MONOCYTES NFR BLD: 10 % (ref 3–10)
NEUTS SEG # BLD: 2.2 K/UL (ref 1.8–8)
NEUTS SEG NFR BLD: 49 % (ref 40–73)
NRBC # BLD: 0 K/UL (ref 0–0.01)
NRBC BLD-RTO: 0 PER 100 WBC
PLATELET # BLD AUTO: 193 K/UL (ref 135–420)
PMV BLD AUTO: 10.5 FL (ref 9.2–11.8)
POTASSIUM SERPL-SCNC: 4.2 MMOL/L (ref 3.5–5.5)
PROT SERPL-MCNC: 7.5 G/DL (ref 6.4–8.2)
RBC # BLD AUTO: 5.11 M/UL (ref 4.35–5.65)
SODIUM SERPL-SCNC: 142 MMOL/L (ref 136–145)
TROPONIN I SERPL HS-MCNC: 3 NG/L (ref 0–78)
WBC # BLD AUTO: 4.6 K/UL (ref 4.6–13.2)

## 2024-02-10 PROCEDURE — 85025 COMPLETE CBC W/AUTO DIFF WBC: CPT

## 2024-02-10 PROCEDURE — 80053 COMPREHEN METABOLIC PANEL: CPT

## 2024-02-10 PROCEDURE — 85379 FIBRIN DEGRADATION QUANT: CPT

## 2024-02-10 PROCEDURE — 99285 EMERGENCY DEPT VISIT HI MDM: CPT

## 2024-02-10 PROCEDURE — 93005 ELECTROCARDIOGRAM TRACING: CPT | Performed by: EMERGENCY MEDICINE

## 2024-02-10 PROCEDURE — 94760 N-INVAS EAR/PLS OXIMETRY 1: CPT

## 2024-02-10 PROCEDURE — 71045 X-RAY EXAM CHEST 1 VIEW: CPT

## 2024-02-10 PROCEDURE — 84484 ASSAY OF TROPONIN QUANT: CPT

## 2024-02-10 ASSESSMENT — PAIN DESCRIPTION - LOCATION: LOCATION: CHEST

## 2024-02-10 ASSESSMENT — PAIN - FUNCTIONAL ASSESSMENT: PAIN_FUNCTIONAL_ASSESSMENT: 0-10

## 2024-02-10 ASSESSMENT — PAIN SCALES - GENERAL: PAINLEVEL_OUTOF10: 8

## 2024-02-11 LAB
EKG ATRIAL RATE: 65 BPM
EKG DIAGNOSIS: NORMAL
EKG P AXIS: 29 DEGREES
EKG P-R INTERVAL: 204 MS
EKG Q-T INTERVAL: 392 MS
EKG QRS DURATION: 92 MS
EKG QTC CALCULATION (BAZETT): 407 MS
EKG R AXIS: 16 DEGREES
EKG T AXIS: 9 DEGREES
EKG VENTRICULAR RATE: 65 BPM
FLUAV RNA SPEC QL NAA+PROBE: NOT DETECTED
FLUBV RNA SPEC QL NAA+PROBE: NOT DETECTED
SARS-COV-2 RNA RESP QL NAA+PROBE: NOT DETECTED

## 2024-02-11 PROCEDURE — 87636 SARSCOV2 & INF A&B AMP PRB: CPT

## 2024-02-11 RX ORDER — HYDROXYZINE HYDROCHLORIDE 25 MG/1
25 TABLET, FILM COATED ORAL EVERY 8 HOURS PRN
Qty: 30 TABLET | Refills: 0 | Status: SHIPPED | OUTPATIENT
Start: 2024-02-11 | End: 2024-02-21

## 2024-02-11 RX ORDER — ALBUTEROL SULFATE 90 UG/1
2 AEROSOL, METERED RESPIRATORY (INHALATION) 4 TIMES DAILY PRN
Qty: 18 G | Refills: 0 | Status: SHIPPED | OUTPATIENT
Start: 2024-02-11

## 2024-02-11 NOTE — ED PROVIDER NOTES
Tabs tablet  Commonly known as: OSCAL     flintstones complete 10 MG Chew tablet     ondansetron 8 MG Tbdp disintegrating tablet  Commonly known as: ZOFRAN-ODT     VITAMIN B 12 PO     vitamin D 1.25 MG (21285 UT) Caps capsule  Commonly known as: ERGOCALCIFEROL               Where to Get Your Medications        These medications were sent to [facility]/pharmacy #[id number] - [place], [place] - [address] - P [phone] - F [phone] [address] [facility] [address]      Phone: [phone]   albuterol sulfate  (90 Base) MCG/ACT inhaler  hydrOXYzine HCl 25 MG tablet        I am the Primary Clinician of Record.       (Please note that parts of this dictation were completed with voice recognition software. Quite often unanticipated grammatical, syntax, homophones, and other interpretive errors are inadvertently transcribed by the computer software. Please disregards these errors. Please excuse any errors that have escaped final proofreading.)       [place] Mena, [facility]  02/11/24 0033

## 2024-02-11 NOTE — ED TRIAGE NOTES
Patient presents with c/o chest pain and shortness of breath x 5 days. Patient states it hurts to take a deep breath. Patient denies N/V.

## 2024-04-15 ENCOUNTER — TELEPHONE (OUTPATIENT)
Age: 34
End: 2024-04-15

## 2024-04-15 NOTE — TELEPHONE ENCOUNTER
Phone call with pt and he is overdue for yearly and he is making that appointment and also given [facility] number for medical weight loss. Pt expresses understanding,    ----- Message from VIDYA Arthur NP sent at 4/15/2024  8:11 AM EDT -----  Regarding: FW: Almost 2 years later weight loss stall issues   Contact: [phone]  Not been seen since 8 months postop and past due for yearly visit. Can set him up for that to review everything, but inform him we do not do medical weight loss please  ----- Message -----  From: Ravi Paris  Sent: 4/13/2024   3:17 AM EDT  To: Sony Wilson Clinical Staff  Subject: Almost 2 years later weight loss stall issue#    Hi, I had gastric sleeve surgery, in July of 22..and since then cali only lost 120+ pounds and have been at my current weight of 330 for almost a whole year now. My current job is physically demanding, yet, my weight continues to fluctuate. I'm honestly at a lost for words..I don't know what I'm doing wrong. I was researching medicine that can help aid in weight loss/management (tirzepatide) seeing that I'm also pre diabetic as well. I was wondering if possible could I possibly be prescribed that.

## 2024-09-11 ENCOUNTER — HOSPITAL ENCOUNTER (EMERGENCY)
Facility: HOSPITAL | Age: 34
Discharge: HOME | End: 2024-09-11
Attending: EMERGENCY MEDICINE

## 2024-09-11 VITALS
OXYGEN SATURATION: 100 % | HEART RATE: 65 BPM | RESPIRATION RATE: 16 BRPM | HEIGHT: 72 IN | TEMPERATURE: 98.3 F | WEIGHT: 315 LBS | BODY MASS INDEX: 42.66 KG/M2 | SYSTOLIC BLOOD PRESSURE: 165 MMHG | DIASTOLIC BLOOD PRESSURE: 94 MMHG

## 2024-09-11 DIAGNOSIS — S39.012A STRAIN OF LUMBAR REGION, INITIAL ENCOUNTER: Primary | ICD-10-CM

## 2024-09-11 PROCEDURE — 99283 EMERGENCY DEPT VISIT LOW MDM: CPT

## 2024-09-11 RX ORDER — OXYCODONE AND ACETAMINOPHEN 5; 325 MG/1; MG/1
1 TABLET ORAL EVERY 6 HOURS PRN
Qty: 12 TABLET | Refills: 0 | Status: SHIPPED | OUTPATIENT
Start: 2024-09-11 | End: 2024-09-14

## 2024-09-11 RX ORDER — METHOCARBAMOL 750 MG/1
750 TABLET, FILM COATED ORAL 4 TIMES DAILY
Qty: 24 TABLET | Refills: 0 | Status: SHIPPED | OUTPATIENT
Start: 2024-09-11 | End: 2024-09-17

## 2024-09-11 RX ORDER — LIDOCAINE 50 MG/G
1 PATCH TOPICAL DAILY
Qty: 30 PATCH | Refills: 0 | Status: SHIPPED | OUTPATIENT
Start: 2024-09-11

## 2024-09-11 ASSESSMENT — PAIN - FUNCTIONAL ASSESSMENT: PAIN_FUNCTIONAL_ASSESSMENT: 0-10

## 2024-09-11 ASSESSMENT — PAIN DESCRIPTION - PAIN TYPE: TYPE: ACUTE PAIN

## 2024-09-11 ASSESSMENT — PAIN DESCRIPTION - FREQUENCY: FREQUENCY: CONTINUOUS

## 2024-09-11 ASSESSMENT — PAIN DESCRIPTION - LOCATION: LOCATION: BACK

## 2024-09-11 ASSESSMENT — PAIN SCALES - GENERAL: PAINLEVEL_OUTOF10: 7

## 2024-09-11 ASSESSMENT — PAIN DESCRIPTION - DESCRIPTORS: DESCRIPTORS: ACHING

## 2024-09-11 ASSESSMENT — PAIN DESCRIPTION - ORIENTATION: ORIENTATION: LOWER

## 2024-11-08 ENCOUNTER — HOSPITAL ENCOUNTER (EMERGENCY)
Facility: HOSPITAL | Age: 34
Discharge: HOME | End: 2024-11-08
Attending: EMERGENCY MEDICINE
Payer: COMMERCIAL

## 2024-11-08 ENCOUNTER — APPOINTMENT (OUTPATIENT)
Facility: HOSPITAL | Age: 34
End: 2024-11-08
Payer: COMMERCIAL

## 2024-11-08 ENCOUNTER — OFFICE VISIT (OUTPATIENT)
Age: 34
End: 2024-11-08
Payer: COMMERCIAL

## 2024-11-08 VITALS
RESPIRATION RATE: 20 BRPM | TEMPERATURE: 98.2 F | SYSTOLIC BLOOD PRESSURE: 143 MMHG | HEART RATE: 67 BPM | OXYGEN SATURATION: 100 % | DIASTOLIC BLOOD PRESSURE: 99 MMHG

## 2024-11-08 VITALS
DIASTOLIC BLOOD PRESSURE: 71 MMHG | TEMPERATURE: 96.8 F | RESPIRATION RATE: 16 BRPM | HEART RATE: 68 BPM | HEIGHT: 72 IN | OXYGEN SATURATION: 100 % | BODY MASS INDEX: 42.66 KG/M2 | WEIGHT: 315 LBS | SYSTOLIC BLOOD PRESSURE: 136 MMHG

## 2024-11-08 DIAGNOSIS — S63.602A SPRAIN OF LEFT THUMB, UNSPECIFIED SITE OF DIGIT, INITIAL ENCOUNTER: Primary | ICD-10-CM

## 2024-11-08 DIAGNOSIS — R73.09 ELEVATED HEMOGLOBIN A1C: ICD-10-CM

## 2024-11-08 DIAGNOSIS — I10 HYPERTENSION, UNSPECIFIED TYPE: ICD-10-CM

## 2024-11-08 DIAGNOSIS — E66.01 MORBID OBESITY: ICD-10-CM

## 2024-11-08 DIAGNOSIS — K90.9 INTESTINAL MALABSORPTION, UNSPECIFIED TYPE: ICD-10-CM

## 2024-11-08 DIAGNOSIS — E66.01 MORBID OBESITY WITH BODY MASS INDEX (BMI) OF 40.0 TO 49.9: Primary | ICD-10-CM

## 2024-11-08 DIAGNOSIS — K21.9 GASTROESOPHAGEAL REFLUX DISEASE, UNSPECIFIED WHETHER ESOPHAGITIS PRESENT: ICD-10-CM

## 2024-11-08 DIAGNOSIS — Z98.84 S/P LAPAROSCOPIC SLEEVE GASTRECTOMY: ICD-10-CM

## 2024-11-08 DIAGNOSIS — F41.9 ANXIETY: ICD-10-CM

## 2024-11-08 DIAGNOSIS — E11.9 TYPE 2 DIABETES MELLITUS WITHOUT COMPLICATION, WITHOUT LONG-TERM CURRENT USE OF INSULIN (HCC): ICD-10-CM

## 2024-11-08 DIAGNOSIS — G47.30 SLEEP APNEA, UNSPECIFIED TYPE: ICD-10-CM

## 2024-11-08 PROCEDURE — 99284 EMERGENCY DEPT VISIT MOD MDM: CPT

## 2024-11-08 PROCEDURE — 3075F SYST BP GE 130 - 139MM HG: CPT | Performed by: SPECIALIST

## 2024-11-08 PROCEDURE — 3078F DIAST BP <80 MM HG: CPT | Performed by: SPECIALIST

## 2024-11-08 PROCEDURE — 6360000002 HC RX W HCPCS: Performed by: EMERGENCY MEDICINE

## 2024-11-08 PROCEDURE — 96372 THER/PROPH/DIAG INJ SC/IM: CPT

## 2024-11-08 PROCEDURE — 73130 X-RAY EXAM OF HAND: CPT

## 2024-11-08 PROCEDURE — 73140 X-RAY EXAM OF FINGER(S): CPT

## 2024-11-08 PROCEDURE — 99214 OFFICE O/P EST MOD 30 MIN: CPT | Performed by: SPECIALIST

## 2024-11-08 RX ORDER — OXYCODONE HYDROCHLORIDE 5 MG/1
5 TABLET ORAL EVERY 8 HOURS PRN
Qty: 12 TABLET | Refills: 0 | Status: SHIPPED | OUTPATIENT
Start: 2024-11-08 | End: 2024-11-12

## 2024-11-08 RX ORDER — LOSARTAN POTASSIUM 50 MG/1
50 TABLET ORAL DAILY
COMMUNITY

## 2024-11-08 RX ORDER — KETOROLAC TROMETHAMINE 15 MG/ML
15 INJECTION, SOLUTION INTRAMUSCULAR; INTRAVENOUS ONCE
Status: COMPLETED | OUTPATIENT
Start: 2024-11-08 | End: 2024-11-08

## 2024-11-08 RX ORDER — METFORMIN HYDROCHLORIDE 500 MG/1
500 TABLET, EXTENDED RELEASE ORAL
COMMUNITY

## 2024-11-08 RX ADMIN — KETOROLAC TROMETHAMINE 15 MG: 15 INJECTION, SOLUTION INTRAMUSCULAR; INTRAVENOUS at 07:34

## 2024-11-08 NOTE — PROGRESS NOTES
03/02/2023    Left flank pain 03/02/2023    Intestinal malabsorption 07/20/2022    S/P laparoscopic sleeve gastrectomy 07/20/2022    Anemia     Diabetes mellitus (HCC)     Sleep apnea     Chronic back pain     GERD (gastroesophageal reflux disease)     Sleep disorder breathing     Anxiety     Morbid obesity     Hypertension     Snores       Past Surgical History:   Procedure Laterality Date    CYST REMOVAL      neck    SLEEVE GASTRECTOMY        Social History     Tobacco Use    Smoking status: Never    Smokeless tobacco: Never   Substance Use Topics    Alcohol use: Not Currently      No family history on file.   Current Outpatient Medications   Medication Sig Dispense Refill    losartan (COZAAR) 50 MG tablet Take 1 tablet by mouth daily      metFORMIN (GLUCOPHAGE-XR) 500 MG extended release tablet Take 1 tablet by mouth daily (with breakfast)      oxyCODONE (ROXICODONE) 5 MG immediate release tablet Take 1 tablet by mouth every 8 hours as needed for Pain (Moderate to severe breakthrough pain if not adequate control with 800 mg ibuprofen, 1 g of Tylenol) for up to 4 days. Intended supply: 3 days. Take lowest dose possible to manage pain Max Daily Amount: 15 mg (Patient not taking: Reported on 11/8/2024) 12 tablet 0    lidocaine (LIDODERM) 5 % Place 1 patch onto the skin daily 12 hours on, 12 hours off. (Patient not taking: Reported on 11/8/2024) 30 patch 0    albuterol sulfate HFA (VENTOLIN HFA) 108 (90 Base) MCG/ACT inhaler Inhale 2 puffs into the lungs 4 times daily as needed for Wheezing (Patient not taking: Reported on 11/8/2024) 18 g 0    acetaminophen (TYLENOL) 500 MG tablet Take 1,000 mg by mouth as needed (Patient not taking: Reported on 11/8/2024)      vitamin D (ERGOCALCIFEROL) 1.25 MG (79693 UT) CAPS capsule TAKE 1 CAPSULE BY MOUTH EVERY 7 DAYS INDICATIONS: VITAMIN D DEFICIENCY (HIGH DOSE THERAPY) (Patient not taking: Reported on 11/8/2024)      ondansetron (ZOFRAN-ODT) 8 MG TBDP disintegrating tablet Take

## 2024-11-08 NOTE — ED TRIAGE NOTES
Patient arrived to the ED from home with complaints of left thumb pain. States he was thumb wresting with his 9yo nephew, when he hear a pop. Patient states his thumb is numb and unable to move backwards. Patient took motrin and BC powder earlier yesterday.

## 2024-11-08 NOTE — ED PROVIDER NOTES
EMERGENCY DEPARTMENT HISTORY AND PHYSICAL EXAM      Date: 11/8/2024  Patient Name: Ravi Paris      History of Presenting Illness     Chief Complaint   Patient presents with    Hand Injury       Location/Duration/Severity/Modifying factors   Chief Complaint   Patient presents with    Hand Injury       HPI:  Ravi Paris is a 34 y.o. male with PMH significant for noncontributory presents with left thumb pain that radiates up the forearm. Pt was playing \"thumb war\" with his nephew when his thumb suddenly was bent \"backward.\" Pt  denies numbness or tingling, change in color of finger.     PCP: Tawana French MD    Current Facility-Administered Medications   Medication Dose Route Frequency Provider Last Rate Last Admin    ketorolac (TORADOL) injection 15 mg  15 mg IntraMUSCular Once Ponce Diop DO         Current Outpatient Medications   Medication Sig Dispense Refill    oxyCODONE (ROXICODONE) 5 MG immediate release tablet Take 1 tablet by mouth every 8 hours as needed for Pain (Moderate to severe breakthrough pain if not adequate control with 800 mg ibuprofen, 1 g of Tylenol) for up to 4 days. Intended supply: 3 days. Take lowest dose possible to manage pain Max Daily Amount: 15 mg 12 tablet 0    lidocaine (LIDODERM) 5 % Place 1 patch onto the skin daily 12 hours on, 12 hours off. 30 patch 0    albuterol sulfate HFA (VENTOLIN HFA) 108 (90 Base) MCG/ACT inhaler Inhale 2 puffs into the lungs 4 times daily as needed for Wheezing 18 g 0    acetaminophen (TYLENOL) 500 MG tablet Take 1,000 mg by mouth as needed      vitamin D (ERGOCALCIFEROL) 1.25 MG (97007 UT) CAPS capsule TAKE 1 CAPSULE BY MOUTH EVERY 7 DAYS INDICATIONS: VITAMIN D DEFICIENCY (HIGH DOSE THERAPY)      ondansetron (ZOFRAN-ODT) 8 MG TBDP disintegrating tablet Take 8 mg by mouth      Pediatric Multivitamins-Iron (FLINTSTONES COMPLETE) 10 MG CHEW tablet Take 1 tablet by mouth daily      calcium carbonate (OSCAL) 500 MG TABS tablet Take 500 mg by mouth  daily      Cyanocobalamin (VITAMIN B 12 PO) Take by mouth         Past History     Past Medical History:  Past Medical History:   Diagnosis Date    Anemia     Anxiety     Chronic back pain     Diabetes (HCC)     GERD (gastroesophageal reflux disease)     Hypertension     S/P laparoscopic sleeve gastrectomy     Sleep apnea        Past Surgical History:  Past Surgical History:   Procedure Laterality Date    CYST REMOVAL      neck    SLEEVE GASTRECTOMY         Family History:  No family history on file.    Social History:  Social History     Tobacco Use    Smoking status: Never    Smokeless tobacco: Never   Substance Use Topics    Alcohol use: Not Currently    Drug use: Never       Allergies:  Allergies   Allergen Reactions    Penicillins Hives         Physical Exam     Cardiovascular: capillary refill <3 seconds in left thumb.  MSK: TTP along left dorsal thumb, no gross deformity to left thumb, difficulty with making A-OK sign and touching thumbs to other digits. Can full extend and abduct left thumb jimenez difficulty with full flexion. No TTP over anatomic snuffbox.  Neuro: SILT along left distal thumb.      Lab and Diagnostic Study Results     Labs -  No results found for this or any previous visit (from the past 24 hour(s)).    Radiologic Studies -   XR HAND LEFT (MIN 3 VIEWS)   Final Result   No acute abnormality.      Electronically signed by RACHEL SANCHEZ      XR FINGER LEFT (MIN 2 VIEWS)   Final Result   No acute abnormality.         Electronically signed by RACHEL SANCHEZ            Procedures and Critical Care     Performed by: RHIANNA WALTERS, DO    Procedures     RHIANNA WALTERS, DO    Medical Decision Making and ED Course   - I am the first and primary provider for this patient AND AM THE PRIMARY PROVIDER OF RECORD.    - I reviewed the vital signs, available nursing notes, past medical history, past surgical history, family history and social history.    - Initial assessment performed. The patients presenting

## 2024-11-11 PROBLEM — R10.9 LEFT FLANK PAIN: Status: RESOLVED | Noted: 2023-03-02 | Resolved: 2024-11-11

## 2024-11-13 ENCOUNTER — HOSPITAL ENCOUNTER (OUTPATIENT)
Facility: HOSPITAL | Age: 34
Discharge: HOME | End: 2024-11-16

## 2024-11-13 VITALS — WEIGHT: 315 LBS | BODY MASS INDEX: 42.66 KG/M2 | HEIGHT: 72 IN

## 2024-11-13 NOTE — PROGRESS NOTES
consume a minimum of 64 oz of sugar-free, caffeine-free and carbonation-free fluids each day.  Patient was encouraged to eliminate sugar-sweetened beverages such as sweet tea, fruit juice, fruit smoothies, flavored coffee drinks and regular sodas.      During the weight loss trial, patient was encouraged to focus on eating protein-forward meals, with a daily protein goal of  grams.  Patient was also advised to decrease carbohydrate intake to  g/d or less, choosing limited amounts of complex, high-fiber vs. simple and processed/refined carbohydrates.  We also discussed limiting fat intake.  Encouraged patient to follow the \"3-gram rule\" when choosing foods by looking for items containing <3 g of fat and sugar per serving.  We reviewed meal planning guidelines and discussed appropriate meal and snack options.  We also talked about protein supplements, and patient was encouraged to start trying these, using them either for a meal replacement or a protein-rich snack up to twice daily before surgery.  We reviewed the nutritional guidelines for selecting protein shakes.      Pre-operative vitamin and mineral supplementation were reviewed.  Patient was instructed to choose a chewable complete multivitamin with iron in NON-gummy form. Selection of probiotic was also reviewed.    Patient's current diet habits include:  Patient is eating 2 meals and 5 snacks per day.  Pt has found 0 protein supplement shakes for use post-op in meeting their protein needs.    Patient's plan for dietary and/or behavior changes in the upcoming month: \"less fried foods\"    Physical Activity/Exercise    Comments:    We talked about exercise.  Patient was given reasons of why exercise is so important and how that can help with their long-term success.  I have encouraged patient to get a support system to help with the activity.  Recommended that pt aim for 150 min/wk moderate to vigorous physical activity.    We talked about recommended

## 2024-11-15 ENCOUNTER — HOSPITAL ENCOUNTER (OUTPATIENT)
Facility: HOSPITAL | Age: 34
Discharge: HOME | End: 2024-11-18
Payer: COMMERCIAL

## 2024-11-15 DIAGNOSIS — K90.9 INTESTINAL MALABSORPTION, UNSPECIFIED TYPE: ICD-10-CM

## 2024-11-15 LAB
25(OH)D3 SERPL-MCNC: 15 NG/ML (ref 30–100)
FERRITIN SERPL-MCNC: 63 NG/ML (ref 8–388)
FOLATE SERPL-MCNC: 10.7 NG/ML (ref 3.1–17.5)
IRON SERPL-MCNC: 70 UG/DL (ref 50–175)
T4 FREE SERPL-MCNC: 1 NG/DL (ref 0.7–1.5)
TSH SERPL DL<=0.05 MIU/L-ACNC: 1.43 UIU/ML (ref 0.36–3.74)
VIT B12 SERPL-MCNC: 340 PG/ML (ref 211–911)

## 2024-11-15 PROCEDURE — 84439 ASSAY OF FREE THYROXINE: CPT

## 2024-11-15 PROCEDURE — 83540 ASSAY OF IRON: CPT

## 2024-11-15 PROCEDURE — 82306 VITAMIN D 25 HYDROXY: CPT

## 2024-11-15 PROCEDURE — 82607 VITAMIN B-12: CPT

## 2024-11-15 PROCEDURE — 84443 ASSAY THYROID STIM HORMONE: CPT

## 2024-11-15 PROCEDURE — 84425 ASSAY OF VITAMIN B-1: CPT

## 2024-11-15 PROCEDURE — 82746 ASSAY OF FOLIC ACID SERUM: CPT

## 2024-11-15 PROCEDURE — 82728 ASSAY OF FERRITIN: CPT

## 2024-11-15 PROCEDURE — 36415 COLL VENOUS BLD VENIPUNCTURE: CPT

## 2024-11-17 LAB — VIT B1 BLD-SCNC: 91.6 NMOL/L (ref 66.5–200)

## 2024-11-20 ENCOUNTER — HOSPITAL ENCOUNTER (OUTPATIENT)
Facility: HOSPITAL | Age: 34
Setting detail: OUTPATIENT SURGERY
Discharge: HOME | End: 2024-11-23
Payer: COMMERCIAL

## 2024-11-20 ENCOUNTER — HOSPITAL ENCOUNTER (OUTPATIENT)
Facility: HOSPITAL | Age: 34
Discharge: HOME | End: 2024-11-23
Payer: COMMERCIAL

## 2024-11-20 VITALS
BODY MASS INDEX: 42.66 KG/M2 | DIASTOLIC BLOOD PRESSURE: 90 MMHG | HEART RATE: 86 BPM | RESPIRATION RATE: 18 BRPM | HEIGHT: 72 IN | SYSTOLIC BLOOD PRESSURE: 144 MMHG | TEMPERATURE: 97.8 F | WEIGHT: 315 LBS

## 2024-11-20 DIAGNOSIS — E66.01 MORBID OBESITY: ICD-10-CM

## 2024-11-20 PROCEDURE — 2500000003 HC RX 250 WO HCPCS: Performed by: SPECIALIST

## 2024-11-20 PROCEDURE — 74240 X-RAY XM UPR GI TRC 1CNTRST: CPT | Performed by: SPECIALIST

## 2024-11-20 PROCEDURE — 74220 X-RAY XM ESOPHAGUS 1CNTRST: CPT

## 2024-11-20 PROCEDURE — 74240 X-RAY XM UPR GI TRC 1CNTRST: CPT

## 2024-11-20 RX ADMIN — BARIUM SULFATE 100 ML: 960 POWDER, FOR SUSPENSION ORAL at 13:41

## 2024-11-20 NOTE — PROCEDURES
[facility]    Upper GI Procedure Report      Ravi Paris    Medical Record Number:[id number]    1990    Date of Service - November 20, 2024    Pre-Op Diagnosis - patient is status post sleeve resection performed by this office in July 2022 with complaint of incomplete weight loss and recent diagnosis of diabetes. They now present for UGI to assess their post surgical anatomy.    Post-Op Diagnosis -same    Procedure - UGI study with barium    Surgeon - Dimas Sanchez MD    Assistant - None    Complications - None    Specimens - None    Implants - None    Estimate Blood Loss - None    Statement of Medical Necessity - Need for radiologic evaluation prior to further management of their care..    Procedure -the patient was brought to the fluoroscopy suite where they were given thin barium.  On swallowing the barium the patient was noted to have normal peristalsis of their esophagus with progressive flow into the distal esophagus.  Specific findings of the distal esophagus revealed that they did not have a hiatal hernia. Contrast flowed normally through the esophagus and into a properly sized sleeve stomach without reflux or obstruction or signs of stricture. The stomach filled in a timely manner and emptied into the duodenum without issue or hesitation. The anatomy was normal for the timeframe with no stricture or obstruction or any other abnormality.  Given the benign findings of today's exam we will plan for a sleeve to bypass conversion.    Dimas Sanchez MD

## 2024-11-20 NOTE — PROGRESS NOTES
[facility] UGI/LAP BAND FOCUS NOTE      Date:  11/20/2024  Time:  1:26 PM    Patient:  Ravi Paris  Procedure:  UGI/Lap Band Fill      Patient Questions  Lap Band Adjustment Patient Questionnaire:  If female, are you pregnant? []Yes     [x]No  Tolerates thick liquids:  [x]Well   []1     []2     []3     []4     []5     []Poorly  Tolerates red meat:  [x]Well   []1     []2     []3     []4     []5     [] Poorly  Tolerates chicken:  [x]Well   []1     []2     []3     []4     []5     []Poorly  Tolerates fish:   [x]Well   []1     []2     []3     []4     []5     []Poorly  Hunger is:   [x]Well Controlled     []1     []2     []3     []4     []5      [] Poorly Controlled  Nightime regurgitation:  [x]Never     []1     []2     []3     []4     []5     []Frequently    Lap Band Info:  Band Type  []Realize     []Realize-C     []AP     []VG     []10cm     []Unknown  []Other      Comments:        Danitza Templeton RN

## 2024-11-27 ENCOUNTER — TELEPHONE (OUTPATIENT)
Age: 34
End: 2024-11-27

## 2024-12-03 ENCOUNTER — TELEPHONE (OUTPATIENT)
Age: 34
End: 2024-12-03

## 2024-12-03 ENCOUNTER — HOSPITAL ENCOUNTER (OUTPATIENT)
Facility: HOSPITAL | Age: 34
Discharge: HOME | End: 2024-12-06

## 2024-12-03 VITALS — WEIGHT: 315 LBS | BODY MASS INDEX: 42.66 KG/M2 | HEIGHT: 72 IN

## 2024-12-03 RX ORDER — ERGOCALCIFEROL 1.25 MG/1
50000 CAPSULE, LIQUID FILLED ORAL
Qty: 8 CAPSULE | Refills: 11 | Status: SHIPPED | OUTPATIENT
Start: 2024-12-05

## 2024-12-03 NOTE — PROGRESS NOTES
fluids, recommending that patient consume a minimum of 64 oz of sugar-free, caffeine-free and carbonation-free fluids each day.  Patient was encouraged to eliminate sugar-sweetened beverages such as sweet tea, fruit juice, fruit smoothies, flavored coffee drinks and regular sodas.      During the weight loss trial, patient was encouraged to focus on eating protein-forward meals, with a daily goal of  grams protein.  Patient was also advised to decrease carbohydrate intake to <100 g/d, choosing complex vs. simple carbohydrates in limited amounts.  We also discussed limiting fat intake.  Encouraged patient to follow the \"3-gram rule\" when choosing foods by looking for items containing <3 g of fat and sugar per serving.  We reviewed meal planning guidelines and discussed appropriate meal and snack options.  We also talked about protein drinks and patient was encouraged to start trying these, using them either for a meal replacement or a protein-rich snack.  We reviewed the nutritional guidelines for selecting protein shakes.      Pre-operative vitamin and mineral supplementation were reviewed.  Patient was instructed to choose chewable complete multivitamin with iron in NON-gummy form. Selection of probiotic was reviewed.    Patient's current diet habits include:  Patient is eating 2 meals and 3 snacks per day.  Pt has found 1 protein supplements/shakes for use post-op in meeting their protein needs.    Patient's plan for dietary and/or behavior changes in the upcoming month: \"no carbs\"    Physical Activity/Exercise    Comments:    We talked about exercise.  Patient was given reasons of why exercise is so important and how that can help with their long-term success.  I have encouraged patient to get a support system to help with the activity.  Recommended that pt aim for 150 min/wk moderate to vigorous physical activity.    We talked about recommended types of physical activity, including walking, swimming,

## 2024-12-03 NOTE — TELEPHONE ENCOUNTER
Patient aware vitamin d low and per [facility] send Vitamin D 50,000 IU two times a week. Pt expresses understanding.

## 2025-01-22 ENCOUNTER — TELEMEDICINE (OUTPATIENT)
Age: 35
End: 2025-01-22

## 2025-01-22 DIAGNOSIS — Z72.0 NICOTINE USE: ICD-10-CM

## 2025-01-22 DIAGNOSIS — E66.01 MORBID OBESITY WITH BODY MASS INDEX (BMI) OF 40.0 TO 49.9: Primary | ICD-10-CM

## 2025-01-22 DIAGNOSIS — I10 HYPERTENSION, UNSPECIFIED TYPE: ICD-10-CM

## 2025-01-22 DIAGNOSIS — E11.9 TYPE 2 DIABETES MELLITUS WITHOUT COMPLICATION, WITHOUT LONG-TERM CURRENT USE OF INSULIN (HCC): ICD-10-CM

## 2025-01-22 DIAGNOSIS — K90.9 INTESTINAL MALABSORPTION, UNSPECIFIED TYPE: ICD-10-CM

## 2025-01-22 DIAGNOSIS — G47.30 SLEEP APNEA, UNSPECIFIED TYPE: ICD-10-CM

## 2025-01-22 DIAGNOSIS — Z98.84 S/P LAPAROSCOPIC SLEEVE GASTRECTOMY: ICD-10-CM

## 2025-01-22 DIAGNOSIS — F12.90 MARIJUANA USE: ICD-10-CM

## 2025-01-22 DIAGNOSIS — K21.9 GASTROESOPHAGEAL REFLUX DISEASE, UNSPECIFIED WHETHER ESOPHAGITIS PRESENT: ICD-10-CM

## 2025-01-22 NOTE — PROGRESS NOTES
Virtual Visit Pre-Operative Progress Consultation  He had a Sleeve Resection in July 2022 at 453 lbs  Conversion Consult early Nov 2024 at 350 lbs      Subjective:     Ravi Paris is a 34 y.o. obese male with a calculated BMI of 47.5 (he reports a weight of 350 lbs).  he desires surgery at this time because of health issues and quality of life issues.  Ravi Paris has tried multiple diets in his lifetime most recently tried a physician supervised dietary plan.    He had an UGI in late Nov 2024 that showed anatomy amenable to a sleeve to bypass conversion.    Bariatric comorbidities present are   Patient Active Problem List   Diagnosis    Chronic back pain    Anemia    GERD (gastroesophageal reflux disease)    Diabetes mellitus (HCC)    Anxiety    Morbid obesity    Sleep apnea    Hypertension    Intestinal malabsorption    S/P laparoscopic sleeve gastrectomy    Hypovitaminosis D    Morbid obesity with body mass index (BMI) of 40.0 to 49.9    Elevated hemoglobin A1c     The patient desires sleeve to bypass conversion for surgical weight loss.  Ravi Paris is here today to check progress with weight loss / evaluate nutritional status and review all subspecialty clearances in hopes of proceeding to the operating room.     Patient Active Problem List    Diagnosis Date Noted    Hypovitaminosis D 03/02/2023    Morbid obesity with body mass index (BMI) of 40.0 to 49.9     Elevated hemoglobin A1c     Intestinal malabsorption 07/20/2022    S/P laparoscopic sleeve gastrectomy 07/20/2022    Anemia     Diabetes mellitus (HCC)     Sleep apnea     Chronic back pain     GERD (gastroesophageal reflux disease)     Anxiety     Morbid obesity     Hypertension       Past Surgical History:   Procedure Laterality Date    CYST REMOVAL      neck    SLEEVE GASTRECTOMY  07/2022    [facility]      Social History     Tobacco Use    Smoking status: Never    Smokeless tobacco: Never   Substance Use Topics    Alcohol use: Yes      Family History

## 2025-02-13 ENCOUNTER — HOSPITAL ENCOUNTER (OUTPATIENT)
Facility: HOSPITAL | Age: 35
Discharge: HOME | End: 2025-02-16
Payer: COMMERCIAL

## 2025-02-13 DIAGNOSIS — Z72.0 NICOTINE USE: ICD-10-CM

## 2025-02-13 DIAGNOSIS — F12.90 MARIJUANA USE: ICD-10-CM

## 2025-02-13 PROCEDURE — 80307 DRUG TEST PRSMV CHEM ANLYZR: CPT

## 2025-02-13 PROCEDURE — 36415 COLL VENOUS BLD VENIPUNCTURE: CPT

## 2025-02-14 LAB
AMPHETAMINES UR QL SCN: NEGATIVE NG/ML
BARBITURATES UR QL SCN: NEGATIVE NG/ML
BENZODIAZ UR QL SCN: NEGATIVE NG/ML
BUPRENORPHINE UR QL: NEGATIVE NG/ML
BZE UR QL SCN: NEGATIVE NG/ML
CANNABINOIDS UR QL SCN: POSITIVE NG/ML
CREAT UR-MCNC: 234.3 MG/DL (ref 20–300)
LABORATORY COMMENT REPORT: ABNORMAL
METHADONE UR QL SCN: NEGATIVE NG/ML
OPIATES UR QL SCN: NEGATIVE NG/ML
OXYCODONE+OXYMORPHONE UR QL SCN: NEGATIVE NG/ML
PCP UR QL: NEGATIVE NG/ML
PH UR: 5.9 (ref 4.5–8.9)
PROPOXYPH UR QL SCN: NEGATIVE NG/ML

## 2025-02-22 LAB
COMMENT:: NORMAL
COTININE UR QL SCN: NEGATIVE NG/ML

## (undated) DEVICE — TROCAR RMFG CANN S-SLV 5X100MM --

## (undated) DEVICE — SUTURE PDS II SZ 0 L27IN ABSRB VLT L26MM CT-2 1/2 CIR Z334H

## (undated) DEVICE — ENDOCUT SCISSOR TIP, DISPOSABLE: Brand: RENEW

## (undated) DEVICE — TUBING, SUCTION, 1/4" X 12', STRAIGHT: Brand: MEDLINE

## (undated) DEVICE — RELOAD STPL H4.1X2MM DIA60MM THCK TISS GRN 6 ROW PWR GST B

## (undated) DEVICE — SHEAR HARMONIC 5MMX45CM -- ACE 7+

## (undated) DEVICE — STRAP,POSITIONING,KNEE/BODY,FOAM,4X60": Brand: MEDLINE

## (undated) DEVICE — SYRINGE,TOOMEY,IRRIGATION,70CC,STERILE: Brand: MEDLINE

## (undated) DEVICE — KIT SUTURING DEVICE M-CLOSE

## (undated) DEVICE — GLOVE ORANGE PI 8   MSG9080

## (undated) DEVICE — BARIATRIC: Brand: MEDLINE INDUSTRIES, INC.

## (undated) DEVICE — VISUALIZATION SYSTEM: Brand: CLEARIFY

## (undated) DEVICE — TROCAR ENDOSCP BLDELSS 12X100 MM W/ HNDL STBL SL OPT TIP

## (undated) DEVICE — SUT MONOCRYL PLUS UD 4-0 --

## (undated) DEVICE — DISPOSABLE SUCTION/IRRIGATOR TUBE SET WITH TIP: Brand: AHTO

## (undated) DEVICE — RELOAD STPL L60MM H2.3-4.2MM VERY THCK TISS BLK 6 ROW

## (undated) DEVICE — STAPLER SKIN L440MM 32MM LNG 12 FIRING B FRM PWR + GRIPPING

## (undated) DEVICE — SPONGE DRAIN NONWOVEN 4X4IN -- 2/PK

## (undated) DEVICE — TROCAR ENDOSCP L100MM DIA15MM BLDELSS STBL SL ENDOPATH XCEL

## (undated) DEVICE — SEALANT TISS 10 CC FOR HUM FIBRIN VISTASEAL

## (undated) DEVICE — AIR SHEET,LAT,COMFORT GLIDE, BLEND 40X80: Brand: MEDLINE

## (undated) DEVICE — SOLUTION SURG PREP 26 CC PURPREP

## (undated) DEVICE — APPLICATOR LAP 35 CM 2 RIGID VISTASEAL

## (undated) DEVICE — NEEDLE INSUF L150MM DIA2MM DISP FOR PNEUMOPERI ENDOPATH

## (undated) DEVICE — SOLUTION LACTATED RINGERS INJECTION USP

## (undated) DEVICE — [HIGH FLOW INSUFFLATOR,  DO NOT USE IF PACKAGE IS DAMAGED,  KEEP DRY,  KEEP AWAY FROM SUNLIGHT,  PROTECT FROM HEAT AND RADIOACTIVE SOURCES.]: Brand: PNEUMOSURE

## (undated) DEVICE — STRIP SKIN CLSR W1XL5IN NYL REINF CURAD

## (undated) DEVICE — STAPLER SKIN LN REINF 60 MM ECHELON ENDOPATH

## (undated) DEVICE — VISIGI 3D®  CALIBRATION SYSTEM  SIZE 36FR STD W/ BULB: Brand: BOEHRINGER® VISIGI 3D™ SLEEVE GASTRECTOMY CALIBRATION SYSTEM, SIZE 36FR W/BULB

## (undated) DEVICE — APPLIER CLP L SHFT DIA12MM 20 ROT MULT LIGACLP

## (undated) DEVICE — Device

## (undated) DEVICE — SOL IRRIGATION INJ NACL 0.9% 500ML BTL

## (undated) DEVICE — GARMENT,MEDLINE,DVT,INT,CALF,MED, GEN2: Brand: MEDLINE

## (undated) DEVICE — RESERVOIR,SUCTION,100CC,SILICONE: Brand: MEDLINE

## (undated) DEVICE — SUTURE ETHIB EXCL BR GRN TAPR PT 2-0 30 X563H X563H

## (undated) DEVICE — SUTURE ETHLN SZ 3-0 L30IN NONABSORBABLE BLK FSL L30MM 3/8 1671H

## (undated) DEVICE — AGENT HEMSTAT W6XL9IN OXIDIZED REGENERATED CELOS ABSRB FOR

## (undated) DEVICE — FORCEPS BX OVL CUP SERR DISP CAP L 240CM RAD JAW 4

## (undated) DEVICE — GLOVE ORANGE PI 7 1/2   MSG9075

## (undated) DEVICE — TROCAR LAP L100MM DIA5MM BLDELSS W/ STBL SL ENDOPATH XCEL

## (undated) DEVICE — TROCAR RMFG ENDOPATH 12X100M --